# Patient Record
Sex: FEMALE | Race: OTHER | HISPANIC OR LATINO | Employment: OTHER | URBAN - METROPOLITAN AREA
[De-identification: names, ages, dates, MRNs, and addresses within clinical notes are randomized per-mention and may not be internally consistent; named-entity substitution may affect disease eponyms.]

---

## 2023-03-24 DIAGNOSIS — M19.90 ARTHRITIS: Primary | ICD-10-CM

## 2023-03-24 RX ORDER — MELOXICAM 15 MG/1
15 TABLET ORAL DAILY
Qty: 90 TABLET | Refills: 1 | Status: SHIPPED | OUTPATIENT
Start: 2023-03-24 | End: 2023-08-07

## 2023-03-24 RX ORDER — MELOXICAM 15 MG/1
15 TABLET ORAL DAILY
COMMUNITY
Start: 2019-10-28 | End: 2023-03-24 | Stop reason: SDUPTHER

## 2023-06-11 DIAGNOSIS — I10 ESSENTIAL (PRIMARY) HYPERTENSION: ICD-10-CM

## 2023-06-11 DIAGNOSIS — F41.8 OTHER SPECIFIED ANXIETY DISORDERS: ICD-10-CM

## 2023-06-12 RX ORDER — LISINOPRIL 10 MG/1
TABLET ORAL
Qty: 90 TABLET | Refills: 3 | Status: SHIPPED | OUTPATIENT
Start: 2023-06-12 | End: 2023-07-26

## 2023-06-12 RX ORDER — CITALOPRAM 20 MG/1
TABLET, FILM COATED ORAL
Qty: 90 TABLET | Refills: 1 | Status: SHIPPED | OUTPATIENT
Start: 2023-06-12 | End: 2023-12-07

## 2023-07-03 PROBLEM — I10 HYPERTENSION: Status: ACTIVE | Noted: 2023-07-03

## 2023-07-03 PROBLEM — F41.8 SITUATIONAL ANXIETY: Status: ACTIVE | Noted: 2023-07-03

## 2023-07-03 PROBLEM — M21.619 BUNION: Status: ACTIVE | Noted: 2023-07-03

## 2023-07-03 PROBLEM — N60.19 FIBROCYSTIC BREAST DISEASE (FCBD): Status: ACTIVE | Noted: 2023-07-03

## 2023-07-03 PROBLEM — H60.11 CELLULITIS OF RIGHT EAR: Status: ACTIVE | Noted: 2023-07-03

## 2023-07-03 PROBLEM — R29.6 RECURRENT FALLS: Status: ACTIVE | Noted: 2023-07-03

## 2023-07-03 PROBLEM — L21.9 SEBORRHEA: Status: ACTIVE | Noted: 2023-07-03

## 2023-07-03 PROBLEM — M25.611 STIFFNESS OF RIGHT SHOULDER, NOT ELSEWHERE CLASSIFIED: Status: ACTIVE | Noted: 2023-07-03

## 2023-07-03 PROBLEM — E55.9 VITAMIN D DEFICIENCY: Status: ACTIVE | Noted: 2023-07-03

## 2023-07-03 PROBLEM — E78.5 DYSLIPIDEMIA: Status: ACTIVE | Noted: 2023-07-03

## 2023-07-03 PROBLEM — R19.7 POSTCHOLECYSTECTOMY DIARRHEA: Status: ACTIVE | Noted: 2023-07-03

## 2023-07-03 PROBLEM — M67.813 BICEPS TENDONOSIS OF RIGHT SHOULDER: Status: ACTIVE | Noted: 2023-07-03

## 2023-07-03 PROBLEM — J30.9 ALLERGIC RHINITIS: Status: ACTIVE | Noted: 2023-07-03

## 2023-07-03 PROBLEM — M85.80 OSTEOPENIA: Status: ACTIVE | Noted: 2023-07-03

## 2023-07-03 PROBLEM — M20.42 HAMMER TOE OF LEFT FOOT: Status: ACTIVE | Noted: 2023-07-03

## 2023-07-03 PROBLEM — M19.079 ANKLE ARTHRITIS: Status: ACTIVE | Noted: 2023-07-03

## 2023-07-03 PROBLEM — Z97.3 WEARS GLASSES: Status: ACTIVE | Noted: 2023-07-03

## 2023-07-03 PROBLEM — K91.89 POSTCHOLECYSTECTOMY DIARRHEA: Status: ACTIVE | Noted: 2023-07-03

## 2023-07-03 PROBLEM — J06.9 UPPER RESPIRATORY INFECTION, ACUTE: Status: ACTIVE | Noted: 2023-07-03

## 2023-07-03 RX ORDER — PNV NO.95/FERROUS FUM/FOLIC AC 28MG-0.8MG
1000 TABLET ORAL DAILY
COMMUNITY
Start: 2022-01-20

## 2023-07-03 RX ORDER — FLUTICASONE PROPIONATE 50 MCG
2 SPRAY, SUSPENSION (ML) NASAL DAILY PRN
COMMUNITY
Start: 2015-12-09 | End: 2023-10-25 | Stop reason: WASHOUT

## 2023-07-03 RX ORDER — EPINEPHRINE 0.22MG
100 AEROSOL WITH ADAPTER (ML) INHALATION DAILY
COMMUNITY
Start: 2021-07-21

## 2023-07-03 RX ORDER — CHOLECALCIFEROL (VITAMIN D3) 125 MCG
1 CAPSULE ORAL DAILY
COMMUNITY
Start: 2013-07-29

## 2023-07-03 RX ORDER — CHOLESTYRAMINE 4 G/4.8G
POWDER, FOR SUSPENSION ORAL
COMMUNITY
End: 2023-07-26 | Stop reason: ALTCHOICE

## 2023-07-15 LAB
ALANINE AMINOTRANSFERASE (SGPT) (U/L) IN SER/PLAS: 16 U/L (ref 7–45)
ANION GAP IN SER/PLAS: 11 MMOL/L (ref 10–20)
CALCIDIOL (25 OH VITAMIN D3) (NG/ML) IN SER/PLAS: 38 NG/ML
CALCIUM (MG/DL) IN SER/PLAS: 9.1 MG/DL (ref 8.6–10.3)
CARBON DIOXIDE, TOTAL (MMOL/L) IN SER/PLAS: 27 MMOL/L (ref 21–32)
CHLORIDE (MMOL/L) IN SER/PLAS: 104 MMOL/L (ref 98–107)
CHOLESTEROL (MG/DL) IN SER/PLAS: 192 MG/DL (ref 0–199)
CHOLESTEROL IN HDL (MG/DL) IN SER/PLAS: 76.7 MG/DL
CHOLESTEROL/HDL RATIO: 2.5
CREATININE (MG/DL) IN SER/PLAS: 0.7 MG/DL (ref 0.5–1.05)
GFR FEMALE: >90 ML/MIN/1.73M2
GLUCOSE (MG/DL) IN SER/PLAS: 88 MG/DL (ref 74–99)
LDL: 106 MG/DL (ref 0–99)
POTASSIUM (MMOL/L) IN SER/PLAS: 3.9 MMOL/L (ref 3.5–5.3)
SODIUM (MMOL/L) IN SER/PLAS: 138 MMOL/L (ref 136–145)
THYROTROPIN (MIU/L) IN SER/PLAS BY DETECTION LIMIT <= 0.05 MIU/L: 1.49 MIU/L (ref 0.44–3.98)
TRIGLYCERIDE (MG/DL) IN SER/PLAS: 49 MG/DL (ref 0–149)
UREA NITROGEN (MG/DL) IN SER/PLAS: 13 MG/DL (ref 6–23)
VLDL: 10 MG/DL (ref 0–40)

## 2023-07-26 ENCOUNTER — OFFICE VISIT (OUTPATIENT)
Dept: PRIMARY CARE | Facility: CLINIC | Age: 68
End: 2023-07-26
Payer: MEDICARE

## 2023-07-26 DIAGNOSIS — Z00.00 HEALTHCARE MAINTENANCE: ICD-10-CM

## 2023-07-26 DIAGNOSIS — Z00.00 ROUTINE GENERAL MEDICAL EXAMINATION AT HEALTH CARE FACILITY: Primary | ICD-10-CM

## 2023-07-26 DIAGNOSIS — I10 ESSENTIAL HYPERTENSION WITH GOAL BLOOD PRESSURE LESS THAN 130/85: ICD-10-CM

## 2023-07-26 DIAGNOSIS — E78.5 DYSLIPIDEMIA, GOAL LDL BELOW 100: ICD-10-CM

## 2023-07-26 PROBLEM — K58.9 IBS (IRRITABLE BOWEL SYNDROME): Status: ACTIVE | Noted: 2023-07-26

## 2023-07-26 PROCEDURE — 99397 PER PM REEVAL EST PAT 65+ YR: CPT | Performed by: INTERNAL MEDICINE

## 2023-07-26 PROCEDURE — G0444 DEPRESSION SCREEN ANNUAL: HCPCS | Performed by: INTERNAL MEDICINE

## 2023-07-26 PROCEDURE — 1036F TOBACCO NON-USER: CPT | Performed by: INTERNAL MEDICINE

## 2023-07-26 PROCEDURE — 1170F FXNL STATUS ASSESSED: CPT | Performed by: INTERNAL MEDICINE

## 2023-07-26 PROCEDURE — 93000 ELECTROCARDIOGRAM COMPLETE: CPT | Performed by: INTERNAL MEDICINE

## 2023-07-26 PROCEDURE — 1160F RVW MEDS BY RX/DR IN RCRD: CPT | Performed by: INTERNAL MEDICINE

## 2023-07-26 PROCEDURE — 1159F MED LIST DOCD IN RCRD: CPT | Performed by: INTERNAL MEDICINE

## 2023-07-26 PROCEDURE — 3079F DIAST BP 80-89 MM HG: CPT | Performed by: INTERNAL MEDICINE

## 2023-07-26 PROCEDURE — G0439 PPPS, SUBSEQ VISIT: HCPCS | Performed by: INTERNAL MEDICINE

## 2023-07-26 PROCEDURE — 3075F SYST BP GE 130 - 139MM HG: CPT | Performed by: INTERNAL MEDICINE

## 2023-07-26 RX ORDER — MULTIVITAMIN
1 TABLET ORAL DAILY
COMMUNITY

## 2023-07-26 RX ORDER — LISINOPRIL 40 MG/1
40 TABLET ORAL DAILY
Qty: 90 TABLET | Refills: 3 | Status: SHIPPED | OUTPATIENT
Start: 2023-07-26 | End: 2024-05-26

## 2023-07-26 ASSESSMENT — ENCOUNTER SYMPTOMS
LOSS OF SENSATION IN FEET: 0
OCCASIONAL FEELINGS OF UNSTEADINESS: 0
DEPRESSION: 0

## 2023-07-26 ASSESSMENT — PATIENT HEALTH QUESTIONNAIRE - PHQ9
1. LITTLE INTEREST OR PLEASURE IN DOING THINGS: NOT AT ALL
SUM OF ALL RESPONSES TO PHQ9 QUESTIONS 1 AND 2: 0
2. FEELING DOWN, DEPRESSED OR HOPELESS: NOT AT ALL

## 2023-07-26 NOTE — PROGRESS NOTES
"Subjective   Reason for Visit: Zita Thorne is an 68 y.o. female here for a Medicare Wellness visit.     Past Medical, Surgical, and Family History reviewed and updated in chart.    Reviewed all medications by prescribing practitioner or clinical pharmacist (such as prescriptions, OTCs, herbal therapies and supplements) and documented in the medical record.    Here for follow-up and wellness visit.  Overall doing fairly well.    She has seen her eye doctor-she has had some eye flashing-she also has elevated intraocular pressure.    No exertional chest pain, palpitations, dizziness, orthopnea or pedal edema.        Patient Care Team:  Osmany Kwok MD as PCP - General  Osmany Kwok MD as PCP - [employer] PCP     Review of Systems    Objective   Vitals:  /84 (BP Location: Left arm, Patient Position: Sitting, BP Cuff Size: Adult)   Pulse 65   Temp 36.6 °C (97.8 °F)   Resp 16   Ht 1.562 m (5' 1.5\")   Wt 62.7 kg (138 lb 3.2 oz)   SpO2 99%   BMI 25.69 kg/m²       Physical Exam  Vitals reviewed.   Constitutional:       Appearance: Normal appearance.   HENT:      Head: Normocephalic and atraumatic.      Right Ear: Tympanic membrane normal.      Left Ear: Tympanic membrane normal.      Nose: Nose normal.   Eyes:      General: No scleral icterus.        Right eye: No discharge.         Left eye: No discharge.      Extraocular Movements: Extraocular movements intact.      Conjunctiva/sclera: Conjunctivae normal.      Pupils: Pupils are equal, round, and reactive to light.   Cardiovascular:      Rate and Rhythm: Normal rate and regular rhythm.      Pulses: Normal pulses.      Heart sounds: Normal heart sounds. No murmur heard.  Pulmonary:      Effort: Pulmonary effort is normal.      Breath sounds: Normal breath sounds. No wheezing or rhonchi.   Musculoskeletal:         General: No deformity or signs of injury. Normal range of motion.      Cervical back: Normal range of motion and neck supple. No " rigidity or tenderness.   Lymphadenopathy:      Cervical: No cervical adenopathy.   Skin:     General: Skin is warm and dry.      Findings: No rash.   Neurological:      General: No focal deficit present.      Mental Status: She is alert and oriented to person, place, and time. Mental status is at baseline.      Cranial Nerves: No cranial nerve deficit.      Sensory: No sensory deficit.      Gait: Gait normal.   Psychiatric:         Mood and Affect: Mood normal.         Behavior: Behavior normal.         Thought Content: Thought content normal.         Judgment: Judgment normal.         Assessment/Plan   Problem List Items Addressed This Visit    None  Visit Diagnoses       Routine general medical examination at health care facility    -  Primary    Relevant Orders    1 Year Follow Up In [facility] - PCP - Wellness Exam    Healthcare maintenance        Relevant Orders    ECG 12 lead (Clinic Performed)    Essential hypertension with goal blood pressure less than 130/85        Relevant Medications    lisinopril 40 mg tablet    Other Relevant Orders    Follow Up In [facility] - PCP - Established    Follow Up In [facility] - Pharmacy    Dyslipidemia, goal LDL below 100                 Hypertension- blood pressure in the past has been controlled with low-dose lisinopril. She will continue healthy diet including low salt approach, as well as fitness routine.  Home blood pressure machine correlated.            Blood pressure elevated.  She will meet with our pharmacy team and advance lisinopril to 40 mg daily. advised to notify us if readings are consistently above 130.     Dyslipidemia-goal LDL under 100.          July 2023-.  For now we will continue off statin     exercise routine - at gym- [employer] near her home 4 x wk. - in AM before work, where she meets w/  who helps her w/ work outs. 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     Right shoulder rotator cuff issues/tendinitis- clearly came on after exercise class doing aggressive workouts. She will see orthopedics and change from ibuprofen to meloxicam.             Improved w/ input from Dr. Santamaria fall '19 with physical therapy    Neck spasms - spring '23 w/ tension headaches.  Improved w/ input per chiropractor, Dr. Olivia.         Right first MTP discomfort-minimal arthritic first MTP changes. Encouraged optimal arch support and wide fitting shoes           Feet OK now     Varicose vein-left tibial area will follow     Hx urticaria -- after Elderberry supp. she saw Dr. Garcia in the allergy clinic spring 2019 to further clarify     Situational anxiety -she will continue citalopram which has been helpful     Occasional epigastric discomfort-clearly much more stress at work. Suggested Pepcid AC or Zantac 75 before work or spicy or acidic foods accordingly and follow-up if symptoms persist.    Presently not active issue     Hx loose bowel mvts - similar to around 2003, when she worked w/ Dr. Haley Goff / [facility], focusing on elimination diet. no severe pain, but still loose BMs - 2-3 BMs daily.  She will try to identify triggers and avoid these accordingly.                  Colonoscopy updated 10/18; next in 10 yrs - 10/28. No bowel Mv concerns now. Stable            With diet changes, she has no dyspepsia or IBS symptoms     Gynecologic care / postmenopausal - s/p pap in May '18 when she had RLQ pain, US showed ovaries \"OK\" but fluid in uterus. Negative EMBx June '18. She plans to f/u ( Dr. Turner). now as needed following fall '18   Her gynecologist retired-she requested a new practice-I suggested [facility] women[facility]           " Presently no gyn concerns     Annual mammogram -updated Feb '23     Osteopenia/vitamin D deficiency-she will continue vitamin D consistently, and DEXA scanning as below-- updated 9/17, as well as her weightbearing exercise routine.            DEXA updated Feb '22. Biannual surveillance. Next 2024.      Right hand pain-history of right hand fracture several years ago with several metacarpal fractures. Presently her index finger and ring finger on the right are sore. 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 s/p allergy visit sev. yrs ago.        Elevated intraocular pressure/floaters/glasses/vision care- patient will continue routine vision exams and visits at least biannually- updated recently w/ new glasses in 12/22.           7/23 update-she has had some right eye pressure problems and floaters.  She continues to work with Dr. Prado     Dental care-encouraged semiannual dental visits. Next appt 7/31/23  UTD     Skin Care concerns - encouraged skin care, sun screen when outdoors, and follow up w/ dermatology w/ any concerning skin changes. Hasn't needed dermatologist fortunately             She finds carbs tends to cause outbreaks on calves and tricep areas. 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 her eldest grandson has had mental health issues-3 grandsons.  the youngest grandson is autistic.             7/23-her grandson is going  to college at [employer].  Its been a good summer with his graduation.      Flu shot encouraged each fall     Prevnar 20- updated  2/23.     Discussed Shingrix / new shingles shot - 2 shot series w/ limited availability. Encouraged patient to consider getting this when/ where available- slip provided.           She has had her first shot     Covid vaccination series recommended/declined     Follow-up semiannually, sooner as needed

## 2023-07-30 VITALS
SYSTOLIC BLOOD PRESSURE: 136 MMHG | RESPIRATION RATE: 16 BRPM | HEIGHT: 62 IN | BODY MASS INDEX: 25.43 KG/M2 | DIASTOLIC BLOOD PRESSURE: 80 MMHG | HEART RATE: 65 BPM | OXYGEN SATURATION: 99 % | WEIGHT: 138.2 LBS | TEMPERATURE: 97.8 F

## 2023-07-30 ASSESSMENT — ACTIVITIES OF DAILY LIVING (ADL)
BATHING: INDEPENDENT
ASSISTIVE_DEVICE: EYEGLASSES
DRESSING YOURSELF: INDEPENDENT
FEEDING YOURSELF: INDEPENDENT
PATIENT'S MEMORY ADEQUATE TO SAFELY COMPLETE DAILY ACTIVITIES?: YES
TOILETING: INDEPENDENT
ADEQUATE_TO_COMPLETE_ADL: YES
GROOMING: INDEPENDENT
HEARING - LEFT EAR: FUNCTIONAL
WALKS IN HOME: INDEPENDENT
HEARING - RIGHT EAR: FUNCTIONAL
JUDGMENT_ADEQUATE_SAFELY_COMPLETE_DAILY_ACTIVITIES: YES

## 2023-07-30 ASSESSMENT — PATIENT HEALTH QUESTIONNAIRE - PHQ9
1. LITTLE INTEREST OR PLEASURE IN DOING THINGS: NOT AT ALL
2. FEELING DOWN, DEPRESSED OR HOPELESS: NOT AT ALL
SUM OF ALL RESPONSES TO PHQ9 QUESTIONS 1 AND 2: 0

## 2023-08-06 DIAGNOSIS — M19.90 ARTHRITIS: ICD-10-CM

## 2023-08-07 RX ORDER — MELOXICAM 15 MG/1
TABLET ORAL
Qty: 90 TABLET | Refills: 3 | Status: SHIPPED | OUTPATIENT
Start: 2023-08-07

## 2023-08-28 ENCOUNTER — APPOINTMENT (OUTPATIENT)
Dept: PHARMACY | Facility: HOSPITAL | Age: 68
End: 2023-08-28
Payer: MEDICARE

## 2023-08-30 ENCOUNTER — CLINICAL SUPPORT (OUTPATIENT)
Dept: PRIMARY CARE | Facility: CLINIC | Age: 68
End: 2023-08-30
Payer: MEDICARE

## 2023-08-30 VITALS — SYSTOLIC BLOOD PRESSURE: 138 MMHG | HEART RATE: 66 BPM | DIASTOLIC BLOOD PRESSURE: 78 MMHG

## 2023-08-30 DIAGNOSIS — I10 HYPERTENSION, UNSPECIFIED TYPE: Primary | ICD-10-CM

## 2023-08-30 DIAGNOSIS — I10 ESSENTIAL HYPERTENSION WITH GOAL BLOOD PRESSURE LESS THAN 130/85: ICD-10-CM

## 2023-08-30 RX ORDER — ACETAMINOPHEN, DIPHENHYDRAMINE HCL, PHENYLEPHRINE HCL 325; 25; 5 MG/1; MG/1; MG/1
5000 TABLET ORAL DAILY
COMMUNITY

## 2023-08-30 RX ORDER — ELECTROLYTES/DEXTROSE
5 SOLUTION, ORAL ORAL DAILY
COMMUNITY

## 2023-08-30 RX ORDER — GLUTAMINE 500 MG
500 CAPSULE ORAL DAILY
COMMUNITY

## 2023-08-30 ASSESSMENT — ENCOUNTER SYMPTOMS
PALPITATIONS: 1
HEADACHES: 1
HYPERTENSION: 1

## 2023-08-30 NOTE — PROGRESS NOTES
Hypertension/Blood Pressure Monitor Validation Initial Visit  Pharmacist Clinic: Hypertension Management    Zita Thorne was referred to the Clinical Pharmacy Team for her blood pressure management.    Referring Provider: Osmany Kwok MD     Subjective     Allergies   Allergen Reactions    Cider Vinegar Hives    Kiwi Hives    Strawberry Hives    Watermelon Hives     watermelon       Hypertension  This is a chronic problem. The current episode started more than 1 year ago. The problem has been gradually worsening since onset. The problem is uncontrolled. Associated symptoms include headaches and palpitations. Agents associated with hypertension include NSAIDs. There are no known risk factors for coronary artery disease. Past treatments include beta blockers. The current treatment provides mild improvement. There are no compliance problems.      Patient's last blood pressure in office was 136/80 mmHg on 07/30/23. Patient does note that she does have some anxiety about her blood pressure being elevated which may play a role in some of her readings being higher than others. She states that she watches her grandchildren 3-5 days per week in the summer and states that she believes her blood pressure has been higher on those days. She does have a family history of hypertension, both her father and mother and all of her siblings have high blood pressure. 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     Diet:   Breakfast: wakes up early, 1/2 banana and protein smoothie (almond milk or yogurt with protein)   Lunch: vegetable, salad, chicken/hamburger eva/salmon (lunch is patient's largest meal), sometimes 1/2 of of potatoe    Dinner: Protein shake, yogurt with fruit   Snacks: Rarely, will have another protein shake if she gets hungry   Fluids: water (1 gallon per day), sometimes green tea   Caffeine: 2 cups of coffee per day (1  in the morning, 1 in the afternoon)     Sodium Intake:  Cooks with salt, switched to himalayen and dash   Very rarely uses canned foods or broths     Physical Activity:   Works as a    M/T/W/F works out an hour and then demos for her classes in addition      Blood Pressure Monitor Device: Omron Series 10 purchased at least 2 years ago     Affordability/Accessibility: No issues  Adherence/Organization: No issues   Did you take your antihypertensive medications this morning: Yes  Time of Administration of Antihypertensive(s):  0900  Have you missed any doses of your antihypertensives? No ; If yes, how many doses? 0  Adverse Effects: Reports increased fatigue with lisinopril  States that she did notice this with the 10 mg dose however took it at night to help avoid the fatigue  Since her dose was increased to the 40 mg she has been taking it in the morning and states the fatigue has been more significant     Hypertension Pharmacotherapy:  Lisinopril 40 mg once daily (increased from 10 mg by Dr. Kwok on 07/26/23)     Historical Hypertension Pharmacotherapy:   Atenolol 25 mg (believes her blood pressure numbers were improved so it was discontinued)     Medication Reconciliation  Added:  Align 1 capsule once daily  Biotin 5 mg once daily   Cyanocobalamin 5000 mcg once daily   L-glutamine 500 mg once daily   Magnesium citrate 1300 mg once daily   Turmeric 1500 mg once daily     Objective     /78 (BP Location: Left arm)   Pulse 66     RELEVANT LAB RESULTS  Lab Results   Component Value Date    BILITOT 0.6 04/02/2019    CALCIUM 9.1 07/15/2023    CO2 27 07/15/2023     07/15/2023    CREATININE 0.70 07/15/2023    GLUCOSE 88 07/15/2023    ALKPHOS 83 04/02/2019    K 3.9 07/15/2023    PROT 7.0 04/02/2019     07/15/2023    AST 20 04/02/2019    ALT 16 07/15/2023    BUN 13 07/15/2023    ANIONGAP 11 07/15/2023    ALBUMIN 4.3 04/02/2019    GFRF >90 07/15/2023     Lab Results   Component Value Date  "   TRIG 49 07/15/2023    CHOL 192 07/15/2023    HDL 76.7 07/15/2023     No results found for: \"HGBA1C\"  The 10-year ASCVD risk score (Amish PARIS, et al., 2019) is: 10.6%    Values used to calculate the score:      Age: 68 years      Sex: Female      Is Non- : No      Diabetic: No      Tobacco smoker: No      Systolic Blood Pressure: 138 mmHg      Is BP treated: Yes      HDL Cholesterol: 76.7 mg/dL      Total Cholesterol: 192 mg/dL    DRUG INTERACTIONS  No significant drug-drug interactions exist that require change in therapy    SMBP Measurements   Date/Time AM PM   23 164/99 157/87   23 172/98 173/91    150/85    2023 184/105     168/86    23 155/92 147/90   23  160/85   23  173/97   23  160/86   08/15/23  154/93   23  166/90   23  159/92   23  161/91   23  171/98   23  156/89     ASSESSMENT OF SMBP MEASUREMENTS  Highest readin/105 mmHg  Lowest readin/90 mmHg  Average: 163/92 mmHg    Has the patient experienced any low blood pressures since last contact? No  denies symptoms of low blood pressure: lightheadedness, dizziness, falls, blurry vision, clammy/pale skin   Has the patient experienced any high blood pressures since last contact? Yes  reports symptoms of high blood pressure: headache    DEVICE ACCURACY TEST   Care team should take five blood pressure readings using a combination of the patient's SMBP device and the office's method of blood pressure measurement.  STEP 1:              A Patient's Monitor left arm 163/93 mmHg HR 67   B Patient's Monitor left arm 138/86 mmHg HR 66   C Office's Monitor left arm 133/89 mmHg HR 67   D Patient's Monitor left arm 138/78 mmHg HR 66   E Office's Monitor left arm NOT NEEDED       STEP 2:  Part 1: Average measurements B and D   Part 2: Compare average of B and D to measurement C   Part 3: If the difference is …  --> Less than 5 mm Hg, this device can be used for " SMBP  --> Between 6 and 10 mm Hg, proceed to Step 3  --> Greater than 10 mm Hg, replace the device before proceeding with your SMBP program  STEP 3:  Part 1: Average measurements C and E   Part 2: Compare average of C and E to measurement D   Part 3: If the difference is …  --> Less than or equal to 10 mm Hg, this device can be used for SMBP  --> Greater than 10 mm Hg, replace the device before proceeding with your SMBP program    CONCLUSION: This BP monitor is acceptable for home BP monitoring.    Assessment/Plan   Problem List Items Addressed This Visit       Hypertension - Primary     Other Visit Diagnoses       Essential hypertension with goal blood pressure less than 130/85        Relevant Orders    Follow Up In [facility] - Pharmacy          Blood Pressure monitor is validated for at home use  Blood pressure log/diary was present during today's visit.   Smoker status: non-smoker  Blood Pressure:  uncontrolled  BP goal of <130/85 is NOT met   CONTINUE: Lisinopril 40 mg 1 tablet by mouth once daily   While patient's home readings over the past month have been well above goal, her readings in-office today were closer to goal with most in the 130s/80s. As her 's health improves and she no longer has to watch her grandchildren, her stress levels should also decrease at least slightly. I encouraged patient to check her blood pressure once daily every evening checking the blood pressure twice each time. I will follow-up with her in ~1 month to assess her readings at that time.   Future Considerations: If blood pressure is still uncontrolled at next visit, can start hydrochlorothiazide 12.5 mg once daily   Patient inquired about her lisinopril as she is taking it in the morning and is experiencing significant fatigue. I informed her that she can switch to taking it in the evening again as this helped her with the 10 mg dose. I explained that if she notices her evening blood pressure readings start to  increase she should switch to checking her blood pressures first thing in the morning prior to drinking coffee and see if they are any lower to give the lisinopril time to work.   Counseling Points:  I have counseled this patient on appropriate blood pressure monitor techniques, provided a blood pressure monitor log, and have advised the patient to contact me with questions regarding their blood pressure.  Medications are properly dosed for renal and hepatic function.    Pharmacy Follow Up: September 27, 2023    PCP Follow Up: January 29, 2024    Zane Holbrook PharmD    Continue all meds under the continuation of care with the referring provider and clinical pharmacy team.

## 2023-08-30 NOTE — PATIENT INSTRUCTIONS
It was a pleasure meeting you today! Here is a summary of what we discussed:     CONTINUE: Lisinopril 40 mg 1 tablet by mouth once daily   It is ok to switch to taking this before bedtime since you are experiencing fatigue after taking it. If you notice your evening blood pressures start to increase with this switch to checking your blood pressure in the morning before drinking coffee to give the lisinopril more time to work on your blood pressures.   Continue making healthy eating choices and your current exercise routine    If you have any questions prior to our next telephone appointment, do not hesitate to reach myself at [phone].     Thank you!

## 2023-09-27 ENCOUNTER — APPOINTMENT (OUTPATIENT)
Dept: PHARMACY | Facility: HOSPITAL | Age: 68
End: 2023-09-27
Payer: MEDICARE

## 2023-09-28 ENCOUNTER — TELEMEDICINE (OUTPATIENT)
Dept: PHARMACY | Facility: HOSPITAL | Age: 68
End: 2023-09-28
Payer: MEDICARE

## 2023-09-28 DIAGNOSIS — I10 ESSENTIAL HYPERTENSION WITH GOAL BLOOD PRESSURE LESS THAN 130/85: ICD-10-CM

## 2023-09-28 ASSESSMENT — ENCOUNTER SYMPTOMS
HEADACHES: 1
HYPERTENSION: 1
BLURRED VISION: 0
PALPITATIONS: 1

## 2023-09-28 NOTE — PROGRESS NOTES
Hypertension/Blood Pressure Monitor Validation Initial Visit  Pharmacist Clinic: Hypertension Management    Zita Thorne was referred to the Clinical Pharmacy Team for her blood pressure management.    Referring Provider: Osmany Kwok MD     Subjective     Allergies   Allergen Reactions    Cider Vinegar Hives    Kiwi Hives    Strawberry Hives    Watermelon Hives     watermelon       Hypertension  This is a chronic problem. The current episode started more than 1 year ago. The problem has been gradually worsening since onset. The problem is uncontrolled. Associated symptoms include headaches, malaise/fatigue and palpitations. Pertinent negatives include no blurred vision or chest pain. Agents associated with hypertension include NSAIDs. There are no known risk factors for coronary artery disease. Past treatments include beta blockers. The current treatment provides mild improvement. There are no compliance problems.      Patient's last blood pressure in office was 138/78 mmHg and 138/86 mmHg on 8/30/2023 with a HR of 66 BPM. She notes added stressors in her life which include the following:   She watches her grandchildren 3-5 days per week in the summer and states that she believes her blood pressure has been higher on those days.  She does have a family history of hypertension, both her father and mother and all of her siblings have high blood pressure.   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   Notes that she has had some friends/family staying in her home.   Dog of 10 years recently passed away as well. Notes that she has been experiencing more stressors in her life.     Diet:   Discussed breakfast, lunch and dinner previously with Zane Holbrook PharmD  Fluids: water (1 gallon per day), sometimes green tea; Has noted that she has begun drinking Liquid IV  Caffeine: 2 cups of coffee per day (1 in the morning,  "1 in the afternoon)  Notes that she had a cup of coffee approximately 45 minutes prior to our call today on 9/28/2023     Sodium Intake:  Cooks with salt, switched to himalayen and dash   Very rarely uses canned foods or broths     Physical Activity:   Works as a  at [employer]  M/T/W/F works out an hour and then demos for her classes in addition      Blood Pressure Monitor Device: Omron Series 10 purchased at least 2 years ago     Affordability/Accessibility: No issues  Adherence/Organization: No issues   Did you take your antihypertensive medications this morning: Yes  Time of Administration of Antihypertensive(s):  0930 ; Retired - But works as a  from 5:30 am to 9 am   Have you missed any doses of your antihypertensives? No ; If yes, how many doses? 0  Adverse Effects:   Fatigue is improving, but notes that she feels she has to continue moving. Feels she could fall asleep 20 minutes after taking it.     Hypertension Pharmacotherapy:  Lisinopril 40 mg once daily (increased from 10 mg by Dr. Kwok on 07/26/23)     Historical Hypertension Pharmacotherapy:   Atenolol 25 mg (believes her blood pressure numbers were improved so it was discontinued)     Medication Reconciliation  - No changes to medication record on 9/28/23    Objective     There were no vitals taken for this visit.    RELEVANT LAB RESULTS  Lab Results   Component Value Date    BILITOT 0.6 04/02/2019    CALCIUM 9.1 07/15/2023    CO2 27 07/15/2023     07/15/2023    CREATININE 0.70 07/15/2023    GLUCOSE 88 07/15/2023    ALKPHOS 83 04/02/2019    K 3.9 07/15/2023    PROT 7.0 04/02/2019     07/15/2023    AST 20 04/02/2019    ALT 16 07/15/2023    BUN 13 07/15/2023    ANIONGAP 11 07/15/2023    ALBUMIN 4.3 04/02/2019    GFRF >90 07/15/2023     Lab Results   Component Value Date    TRIG 49 07/15/2023    CHOL 192 07/15/2023    HDL 76.7 07/15/2023     No results found for: \"HGBA1C\"  The 10-year ASCVD risk score " (Amish PARIS, et al., 2019) is: 10.6%    Values used to calculate the score:      Age: 68 years      Sex: Female      Is Non- : No      Diabetic: No      Tobacco smoker: No      Systolic Blood Pressure: 138 mmHg      Is BP treated: Yes      HDL Cholesterol: 76.7 mg/dL      Total Cholesterol: 192 mg/dL    DRUG INTERACTIONS  No significant drug-drug interactions exist that require change in therapy    Timing of BP Readings: 3 pm - 8 pm  Arm of Choice: Left   Timing of Medication: ~9-930 am     SMBP Measurements      Date SBP DBP   2023 156 89   2023 150 86   2023 144 81   2023 154 81   2023 144 81   2023 155 86   2023 154 83   Average 151 84     ASSESSMENT OF SMBP MEASUREMENTS  Highest readin/89 mmHg  Lowest readin/81 mmHg  Average: 151/84 mmHg    SMBP Measurements   Date/Time AM PM   23 164/99 157/87   23 172/98 173/91    150/85    2023 184/105     168/86    23 155/92 147/90   23  160/85   23  173/97   23  160/86   08/15/23  154/93   23  166/90   23  159/92   23  161/91   23  171/98   23  156/89     ASSESSMENT OF SMBP MEASUREMENTS  Highest readin/105 mmHg  Lowest readin/90 mmHg  Average: 163/92 mmHg    Has the patient experienced any low blood pressures since last contact? No  denies symptoms of low blood pressure: lightheadedness, dizziness, falls, blurry vision, clammy/pale skin   Has the patient experienced any high blood pressures since last contact? Yes  reports symptoms of high blood pressure: Headache (States this has improved, notes approximately two headaches)    Assessment/Plan   Problem List Items Addressed This Visit       Essential hypertension with goal blood pressure less than 130/85    Relevant Medications    amLODIPine (Norvasc) 5 mg tablet    Other Relevant Orders    Follow Up In [facility] - Pharmacy     Blood Pressure monitor is  validated for at home use  Blood pressure log/diary was present during today's visit. 7 readings were available between previous visit and today.   Smoker status: non-smoker  Blood Pressure:  uncontrolled  BP goal of <130/85 is NOT met   Start:  Amlodipine 5 mg: Take one tablet by mouth once daily in the morning  CONTINUE:   Lisinopril 40 mg 1 tablet by mouth once daily   Education:     I have advised Zita to begin taking her Amlodipine 5 mg once daily in the morning and change her time of administration of Lisinopril 40 mg to once daily in the evening before bedtime  She notes that she experiences fatigue approximately 20 minutes after administration of Lisinopril, I do not endorse that this side effect is occurring due to the administration of Lisinopril  We discussed alternative medication therapy including hydrochlorothiazide and chlorthalidone as alternative medication therapies. However, at this time I believe the addition of a non-DHP would provide the most benefit to her currently elevated BP readings.  I do also believe that the added stressors in her home and environmentally are affecting her readings.   I have encouraged zita to begin more frequent monitoring on a regularly scheduled basis for better interpretation of her home readings.   Medications are properly dosed for renal and hepatic function.    Pharmacy Follow Up: 4 weeks  PCP Follow Up: January 29, 2024    Deejay Sadler, PharmD    Continue all meds under the continuation of care with the referring provider and clinical pharmacy team.

## 2023-09-29 RX ORDER — AMLODIPINE BESYLATE 5 MG/1
5 TABLET ORAL DAILY
Qty: 30 TABLET | Refills: 5 | Status: SHIPPED | OUTPATIENT
Start: 2023-09-29 | End: 2024-02-16

## 2023-10-25 ENCOUNTER — TELEMEDICINE (OUTPATIENT)
Dept: PHARMACY | Facility: HOSPITAL | Age: 68
End: 2023-10-25
Payer: MEDICARE

## 2023-10-25 DIAGNOSIS — I10 ESSENTIAL HYPERTENSION WITH GOAL BLOOD PRESSURE LESS THAN 130/85: ICD-10-CM

## 2023-10-25 ASSESSMENT — ENCOUNTER SYMPTOMS
HYPERTENSION: 1
BLURRED VISION: 0

## 2023-10-25 NOTE — PROGRESS NOTES
Hypertension/Blood Pressure Monitor Validation Follow-Up Visit  Pharmacist Clinic: Hypertension Management    Zita Thorne was referred to the Clinical Pharmacy Team for her blood pressure management.    Referring Provider: Osmany Kwok MD     Subjective     Allergies   Allergen Reactions    Cider Vinegar Hives    Kiwi Hives    Strawberry Hives    Watermelon Hives     watermelon     Hypertension  This is a chronic problem. The current episode started more than 1 year ago. The problem has been resolved since onset. The problem is controlled. Pertinent negatives include no blurred vision or chest pain. Agents associated with hypertension include NSAIDs. There are no known risk factors for coronary artery disease. Past treatments include beta blockers. The current treatment provides significant improvement. There are no compliance problems.      Patient's last blood pressure in office was 138/78 mmHg and 138/86 mmHg on 8/30/2023 with a HR of 66 BPM. She notes added stressors in her life which include the following:   She watches her grandchildren 3-5 days per week in the summer and states that she believes her blood pressure has been higher on those days.  She does have a family history of hypertension, both her father and mother and all of her siblings have high blood pressure.   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   Notes that she has had some friends/family staying in her home.   Dog of 10 years recently passed away as well. Notes that she has been experiencing more stressors in her life.     Diet:   Discussed breakfast, lunch and dinner previously with Zane Holbrook PharmD  Fluids: water (1 gallon per day), sometimes green tea; Has noted that she has begun drinking Liquid IV  Caffeine: 2 cups of coffee per day (1 in the morning, 1 in the afternoon)    Sodium Intake:  Cooks with salt, switched to himalayen  and dash   Very rarely uses canned foods or broths     Physical Activity:   Works as a  at [employer]  M/T/W/F works out an hour and then demos for her classes in addition      Hypertension Pharmacotherapy:  Lisinopril 40 mg once daily at bedtime   Amlodipine 5 mg: Take one tablet by mouth once daily at 9 am (Started on 10/7/2023)    Historical Hypertension Pharmacotherapy:   Atenolol 25 mg (believes her blood pressure numbers were improved so it was discontinued)     Medication Reconciliation  - No changes to medication record on 23    SMBP Monitoring: Blood Pressure Monitor Device: Omron Series 10 purchased at least 2 years ago     Timing of BP Readings: 10 am - 1 pm   Arm of Choice: Left arm and right arm  Timing of Medication: ~9-930 am ; ~10 pm     Left Arm  Right Arm    Date SBP DBP SBP DBP   10/1/2023       10/2/2023 143 81 134 85   10/3/2023       10/4/2023 132 88 131 82   10/5/2023       10/6/2023 147 84 146 83   10/7/2023 152 89 140 77   10/8/2023       10/9/2023       10/10/2023 130 77 133 81   10/11/2023 138 86 137 89   10/12/2023 137 77 136 76   10/13/2023 122 77 129 76   10/14/2023       10/15/2023       10/16/2023 120 79 120 78   10/17/2023 144 82 142 82   10/18/2023       10/19/2023 125 73 122 72   10/20/2023 129 78 135 75   10/21/2023       10/22/2023 122 75 125 73   10/23/2023       10/24/2023 125 78 128 79   10/25/2023 118 71 117 73     ASSESSMENT OF SMBP MEASUREMENTS  Highest readin/89 mmHg  Lowest readin/73 mmHg  Left Arm Average: 128/77 mmHg  Right Arm Average: 129/77 mmHg    ASSESSMENT OF PREVIOUS SMBP MEASUREMENTS  Highest readin/89 mmHg  Lowest readin/81 mmHg  Average: 151/84 mmHg    Affordability/Accessibility: No issues  Adherence/Organization: No issues   Did you take your antihypertensive medications this morning: Yes  Time of Administration of Antihypertensive(s):  0930 ; Retired - But works as a  from 5:30 am to 9 am  "  Have you missed any doses of your antihypertensives? No ; If yes, how many doses? 0  Adverse Effects:   No noted adverse events since starting Amlodipine 5 mg   Denies dizziness, lightheadedness, falls    Objective     There were no vitals taken for this visit.    RELEVANT LAB RESULTS  Lab Results   Component Value Date    BILITOT 0.6 04/02/2019    CALCIUM 9.1 07/15/2023    CO2 27 07/15/2023     07/15/2023    CREATININE 0.70 07/15/2023    GLUCOSE 88 07/15/2023    ALKPHOS 83 04/02/2019    K 3.9 07/15/2023    PROT 7.0 04/02/2019     07/15/2023    AST 20 04/02/2019    ALT 16 07/15/2023    BUN 13 07/15/2023    ANIONGAP 11 07/15/2023    ALBUMIN 4.3 04/02/2019    GFRF >90 07/15/2023     Lab Results   Component Value Date    TRIG 49 07/15/2023    CHOL 192 07/15/2023    HDL 76.7 07/15/2023     No results found for: \"HGBA1C\"  The 10-year ASCVD risk score (Amish PARIS, et al., 2019) is: 10.6%    Values used to calculate the score:      Age: 68 years      Sex: Female      Is Non- : No      Diabetic: No      Tobacco smoker: No      Systolic Blood Pressure: 138 mmHg      Is BP treated: Yes      HDL Cholesterol: 76.7 mg/dL      Total Cholesterol: 192 mg/dL    DRUG INTERACTIONS  No significant drug-drug interactions exist that require change in therapy    Assessment/Plan   Problem List Items Addressed This Visit       Essential hypertension with goal blood pressure less than 130/85   Blood Pressure monitor is validated for at home use  Blood pressure log/diary was present during today's visit between previous visit and today.   Smoker status: non-smoker  Blood Pressure:  controlled  BP goal of <130/85 is met   CONTINUE:   Lisinopril 40 mg 1 tablet by mouth once daily in the evening  Amlodipine 5 mg: Take one tablet by mouth once daily in the morning  Education:   I have advised Zita to continue taking her Amlodipine 5 mg once daily in the morning and change her time of administration of " Lisinopril 40 mg to once daily in the evening before bedtime  Continue monitoring BP in both arms every other day. Follow up in 3 months.  Medications are properly dosed for renal and hepatic function.    Pharmacy Follow Up: 3 months  PCP Follow Up: January 29, 2024    Deejay Sadler PharmD    Continue all meds under the continuation of care with the referring provider and clinical pharmacy team.

## 2023-12-07 DIAGNOSIS — F41.8 OTHER SPECIFIED ANXIETY DISORDERS: ICD-10-CM

## 2023-12-07 RX ORDER — CITALOPRAM 20 MG/1
20 TABLET, FILM COATED ORAL DAILY
Qty: 90 TABLET | Refills: 1 | Status: SHIPPED | OUTPATIENT
Start: 2023-12-07 | End: 2023-12-20 | Stop reason: SDUPTHER

## 2023-12-20 DIAGNOSIS — F41.8 OTHER SPECIFIED ANXIETY DISORDERS: ICD-10-CM

## 2023-12-20 RX ORDER — CITALOPRAM 20 MG/1
20 TABLET, FILM COATED ORAL DAILY
Qty: 90 TABLET | Refills: 1 | Status: SHIPPED | OUTPATIENT
Start: 2023-12-20

## 2024-01-10 ENCOUNTER — TELEMEDICINE (OUTPATIENT)
Dept: PHARMACY | Facility: HOSPITAL | Age: 69
End: 2024-01-10
Payer: MEDICARE

## 2024-01-10 DIAGNOSIS — I10 ESSENTIAL HYPERTENSION WITH GOAL BLOOD PRESSURE LESS THAN 130/85: ICD-10-CM

## 2024-01-10 RX ORDER — AMOXICILLIN AND CLAVULANATE POTASSIUM 875; 125 MG/1; MG/1
1 TABLET, FILM COATED ORAL 2 TIMES DAILY
COMMUNITY
Start: 2024-01-04 | End: 2024-01-11

## 2024-01-10 ASSESSMENT — ENCOUNTER SYMPTOMS
HYPERTENSION: 1
BLURRED VISION: 0

## 2024-01-10 NOTE — PROGRESS NOTES
Hypertension/Blood Pressure Monitor Validation Follow-Up Visit  Pharmacist Clinic: Hypertension Management    Zita Thorne was referred to the Clinical Pharmacy Team for her blood pressure management.    Referring Provider: Osmany Kwok MD     Subjective     Allergies   Allergen Reactions    Cider Vinegar Hives    Kiwi Hives    Strawberry Hives    Watermelon Hives     watermelon     Hypertension  This is a chronic problem. The current episode started more than 1 year ago. The problem has been resolved since onset. The problem is controlled. Pertinent negatives include no blurred vision or chest pain. Agents associated with hypertension include NSAIDs. There are no known risk factors for coronary artery disease. Past treatments include beta blockers. The current treatment provides significant improvement. There are no compliance problems.      Diet:   Discussed breakfast, lunch and dinner previously with Zane Holbrook PharmD  Fluids: water (1 gallon per day), sometimes green tea; Has noted that she has begun drinking Liquid IV  Caffeine: 2 cups of coffee per day (1 in the morning, 1 in the afternoon)    Sodium Intake:  Cooks with salt, switched to himalayen and dash   Very rarely uses canned foods or broths     Physical Activity:   Works as a  at [employer]  M/T/W/F works out an hour and then demos for her classes in addition      Hypertension Pharmacotherapy:  Lisinopril 40 mg once daily at bedtime   Amlodipine 5 mg: Take one tablet by mouth once daily at 9 am (Started on 10/7/2023)    Historical Hypertension Pharmacotherapy:   Atenolol 25 mg (believes her blood pressure numbers were improved so it was discontinued)     Medication Reconciliation  Added:   - Amoxicillin/Clavulanate  875/125 mg: Take one tablet by mouth twice daily (therapy to be completed on 1/11/2024)    SMBP Monitoring: Blood Pressure Monitor Device: Omron Series 10 purchased at least 2 years ago     Timing of BP  Readings: 10 am - 1 pm   Arm of Choice: Left arm and right arm  Timing of Medication: ~9-930 am ; ~10 pm     Left  Right    Date SBP DBP SBP DBP   1/3/2024 169 97     2024 122 77     2024 115 71 109 73   2024 142 78 130 78   2024 135 80 138 78   1/10/2024 124 74 124 79   Average 135 79 125 77     Note: 1/3/2024 patient presented to Urgent care and noted to be taking pseudoephedrine containing products which likely caused an increase in her BP.     ASSESSMENT OF SMBP MEASUREMENTS  Highest readin/97 mmHg  Lowest readin/73 mmHg  Left Arm Average: 128/76 mmHg (Not including 1/3/2024 BP)  Right Arm Average: 125/77 mmHg    Previous Encounter: 10/1/2023-10/25/2023   Left Arm  Right Arm    Date SBP DBP SBP DBP   10/1/2023       10/2/2023 143 81 134 85   10/3/2023       10/4/2023 132 88 131 82   10/5/2023       10/6/2023 147 84 146 83   10/7/2023 152 89 140 77   10/8/2023       10/9/2023       10/10/2023 130 77 133 81   10/11/2023 138 86 137 89   10/12/2023 137 77 136 76   10/13/2023 122 77 129 76   10/14/2023       10/15/2023       10/16/2023 120 79 120 78   10/17/2023 144 82 142 82   10/18/2023       10/19/2023 125 73 122 72   10/20/2023 129 78 135 75   10/21/2023       10/22/2023 122 75 125 73   10/23/2023       10/24/2023 125 78 128 79   10/25/2023 118 71 117 73     ASSESSMENT OF SMBP MEASUREMENTS  Highest readin/89 mmHg  Lowest readin/73 mmHg  Left Arm Average: 128/77 mmHg  Right Arm Average: 129/77 mmHg    INITIAL ASSESSMENT OF PREVIOUS SMBP MEASUREMENTS  Highest readin/89 mmHg  Lowest readin/81 mmHg  Average: 151/84 mmHg    Affordability/Accessibility: No issues  Adherence/Organization: No issues   Did you take your antihypertensive medications this morning: Yes  Time of Administration of Antihypertensive(s):  930 ; Retired - But works as a  from 5:30 am to 9 am   Have you missed any doses of your antihypertensives? No  "; If yes, how many doses? 0  Adverse Effects:   No noted adverse events since starting Amlodipine 5 mg   Denies dizziness, lightheadedness, falls    Objective     There were no vitals taken for this visit.    RELEVANT LAB RESULTS  Lab Results   Component Value Date    BILITOT 0.6 04/02/2019    CALCIUM 9.1 07/15/2023    CO2 27 07/15/2023     07/15/2023    CREATININE 0.70 07/15/2023    GLUCOSE 88 07/15/2023    ALKPHOS 83 04/02/2019    K 3.9 07/15/2023    PROT 7.0 04/02/2019     07/15/2023    AST 20 04/02/2019    ALT 16 07/15/2023    BUN 13 07/15/2023    ANIONGAP 11 07/15/2023    ALBUMIN 4.3 04/02/2019    GFRF >90 07/15/2023     Lab Results   Component Value Date    TRIG 49 07/15/2023    CHOL 192 07/15/2023    HDL 76.7 07/15/2023     No results found for: \"HGBA1C\"  The 10-year ASCVD risk score (Amish PARIS, et al., 2019) is: 10.6%    Values used to calculate the score:      Age: 68 years      Sex: Female      Is Non- : No      Diabetic: No      Tobacco smoker: No      Systolic Blood Pressure: 138 mmHg      Is BP treated: Yes      HDL Cholesterol: 76.7 mg/dL      Total Cholesterol: 192 mg/dL    DRUG INTERACTIONS  No significant drug-drug interactions exist that require change in therapy    Assessment/Plan   Problem List Items Addressed This Visit       Essential hypertension with goal blood pressure less than 130/85     Blood Pressure monitor is validated for at home use  Blood pressure log/diary was present during today's visit between previous visit and today.   Smoker status: non-smoker  Blood Pressure:  controlled  - I recommend continuation of therapy with current HTN regimen. If BP trends towards elevation and not at goal then consideration for increased amlodipine to 10 mg once daily would be warranted. Advised patient to continue monitoring until her PCP follow up. If continued services from pharmacy are necessary I am happy to follow up. I believe her BP to be controlled at " this time with assessment of her home readings.   BP goal of <130/85 is met   CONTINUE:   Lisinopril 40 mg 1 tablet by mouth once daily in the evening  Amlodipine 5 mg: Take one tablet by mouth once daily in the morning  Education:   I have advised Zita to continue taking her Amlodipine 5 mg once daily in the morning and change her time of administration of Lisinopril 40 mg to once daily in the evening before bedtime  Continue monitoring BP in both arms every other day. Follow up in 3 months.  Medications are properly dosed for renal and hepatic function.    Pharmacy Follow Up: PRN  PCP Follow Up: January 29, 2024    Deejay Sadler, PharmD    Continue all meds under the continuation of care with the referring provider and clinical pharmacy team.

## 2024-01-29 ENCOUNTER — OFFICE VISIT (OUTPATIENT)
Dept: PRIMARY CARE | Facility: CLINIC | Age: 69
End: 2024-01-29
Payer: MEDICARE

## 2024-01-29 VITALS
HEIGHT: 62 IN | HEART RATE: 65 BPM | BODY MASS INDEX: 26.28 KG/M2 | RESPIRATION RATE: 16 BRPM | TEMPERATURE: 97.2 F | WEIGHT: 142.8 LBS | SYSTOLIC BLOOD PRESSURE: 136 MMHG | DIASTOLIC BLOOD PRESSURE: 84 MMHG | OXYGEN SATURATION: 98 %

## 2024-01-29 DIAGNOSIS — Z23 IMMUNIZATION DUE: Primary | ICD-10-CM

## 2024-01-29 DIAGNOSIS — Z12.31 ENCOUNTER FOR SCREENING MAMMOGRAM FOR BREAST CANCER: ICD-10-CM

## 2024-01-29 DIAGNOSIS — E55.9 VITAMIN D DEFICIENCY: ICD-10-CM

## 2024-01-29 DIAGNOSIS — E78.5 DYSLIPIDEMIA, GOAL LDL BELOW 100: ICD-10-CM

## 2024-01-29 DIAGNOSIS — Z78.0 POSTMENOPAUSAL STATE: ICD-10-CM

## 2024-01-29 DIAGNOSIS — I10 ESSENTIAL HYPERTENSION WITH GOAL BLOOD PRESSURE LESS THAN 130/85: ICD-10-CM

## 2024-01-29 PROCEDURE — 1159F MED LIST DOCD IN RCRD: CPT | Performed by: INTERNAL MEDICINE

## 2024-01-29 PROCEDURE — 1123F ACP DISCUSS/DSCN MKR DOCD: CPT | Performed by: INTERNAL MEDICINE

## 2024-01-29 PROCEDURE — 3075F SYST BP GE 130 - 139MM HG: CPT | Performed by: INTERNAL MEDICINE

## 2024-01-29 PROCEDURE — 1036F TOBACCO NON-USER: CPT | Performed by: INTERNAL MEDICINE

## 2024-01-29 PROCEDURE — 3079F DIAST BP 80-89 MM HG: CPT | Performed by: INTERNAL MEDICINE

## 2024-01-29 PROCEDURE — 99214 OFFICE O/P EST MOD 30 MIN: CPT | Performed by: INTERNAL MEDICINE

## 2024-01-29 PROCEDURE — 1160F RVW MEDS BY RX/DR IN RCRD: CPT | Performed by: INTERNAL MEDICINE

## 2024-01-29 ASSESSMENT — ENCOUNTER SYMPTOMS
LOSS OF SENSATION IN FEET: 0
OCCASIONAL FEELINGS OF UNSTEADINESS: 0
DEPRESSION: 0

## 2024-01-29 NOTE — PROGRESS NOTES
"Subjective   Patient ID: Zita Thorne is a 68 y.o. female who presents for Follow-up.    Here for follow-up  Overall doing well.  Sadly she lost her 10-year-old Doberman pincher dog  Blood pressure at home typically in the 120s-recent average 128/76         Review of Systems    Objective   /84 (BP Location: Left arm, Patient Position: Sitting, BP Cuff Size: Adult)   Pulse 65   Temp 36.2 °C (97.2 °F)   Resp 16   Ht 1.562 m (5' 1.5\")   Wt 64.8 kg (142 lb 12.8 oz)   SpO2 98%   BMI 26.54 kg/m²     Physical Exam  Vitals reviewed.   Constitutional:       Appearance: Normal appearance.   HENT:      Head: Normocephalic and atraumatic.   Eyes:      General: No scleral icterus.        Right eye: No discharge.         Left eye: No discharge.      Extraocular Movements: Extraocular movements intact.      Conjunctiva/sclera: Conjunctivae normal.      Pupils: Pupils are equal, round, and reactive to light.   Cardiovascular:      Rate and Rhythm: Normal rate and regular rhythm.      Pulses: Normal pulses.      Heart sounds: Normal heart sounds. No murmur heard.  Pulmonary:      Effort: Pulmonary effort is normal.      Breath sounds: Normal breath sounds. No wheezing or rhonchi.   Musculoskeletal:         General: No deformity or signs of injury. Normal range of motion.      Cervical back: Normal range of motion and neck supple. No rigidity or tenderness.   Lymphadenopathy:      Cervical: No cervical adenopathy.   Skin:     General: Skin is warm and dry.      Findings: No rash.   Neurological:      General: No focal deficit present.      Mental Status: She is alert and oriented to person, place, and time. Mental status is at baseline.      Cranial Nerves: No cranial nerve deficit.      Sensory: No sensory deficit.      Gait: Gait normal.   Psychiatric:         Mood and Affect: Mood normal.         Behavior: Behavior normal.         Thought Content: Thought content normal.         Judgment: Judgment normal. "         Assessment/Plan   Problem List Items Addressed This Visit             ICD-10-CM    Vitamin D deficiency E55.9    Relevant Orders    Vitamin D 25-Hydroxy,Total (for eval of Vitamin D levels)    Essential hypertension with goal blood pressure less than 130/85 I10    Relevant Orders    Basic Metabolic Panel     Other Visit Diagnoses         Codes    Immunization due    -  Primary Z23    Relevant Medications    respiratory syncytial virus, RSV, vaccine, adjuvanted, age 65y+ (AREXVY) 120 mcg/0.5 mL suspension for reconstitution    Encounter for screening mammogram for breast cancer     Z12.31    Relevant Orders    BI mammo bilateral screening tomosynthesis    Postmenopausal state     Z78.0    Relevant Orders    XR DEXA bone density    Dyslipidemia, goal LDL below 100     E78.5    Relevant Orders    Lipid Panel    TSH with reflex to Free T4 if abnormal    Alanine Aminotransferase              Hypertension- blood pressure in the past has been controlled with low-dose lisinopril. She will continue healthy diet including low salt approach, as well as fitness routine.  Home blood pressure machine correlated.            Blood pressure improved on recheck-home machine has been checked with our pharmacy team and correlates-she will follow her blood pressure at home and notify us if the average top number is consistently above 130.  Recent home blood pressure average 128/76     Dyslipidemia-goal LDL under 100.          July 2023-.  For now we will continue off statin                1/24-annual fasting lipids ordered       exercise routine - at gym- [employer] near her home 4 x wk. - in AM before work, where she meets w/  who helps her w/ work outs. She teaches classes, and works out w/ a  once a week for an hour   She will continue her exercise workout routine at [employer] 4 x wk in the morning, teaching classes too they now have privileges to go to other health centers and so on Monday  "afternoon she does workouts, as well as Thursdays and Fridays.     Right shoulder rotator cuff issues/tendinitis- clearly came on after exercise class doing aggressive workouts. She will see orthopedics and change from ibuprofen to meloxicam.             Improved w/ input from Dr. Santamaria fall '19 with physical therapy    Neck spasms - spring '23 w/ tension headaches.             Improved w/ input per chiropractor, Dr. Olivia.         Right first MTP discomfort-minimal arthritic first MTP changes. Encouraged optimal arch support and wide fitting shoes           Feet OK now     Varicose vein-left tibial area will follow     Hx urticaria -- after Elderberry supp. she saw Dr. 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 Suggested Pepcid AC or Zantac 75 before work or spicy or acidic foods accordingly and follow-up if symptoms persist.              Presently not active issue     Hx loose bowel mvts - similar to around 2003, when she worked w/ Dr. Haley Goff / [facility], focusing on elimination diet. no severe pain, but still loose BMs - 2-3 BMs daily.  She will try to identify triggers and avoid these accordingly.                  Colonoscopy updated 10/18; next in 10 yrs - 10/28. No bowel Mv concerns now. Stable            With diet changes, she has no dyspepsia or IBS symptoms     Gynecologic care / postmenopausal - s/p pap in May '18 when she had RLQ pain, US showed ovaries \"OK\" but fluid in uterus. Negative EMBx June '18.    Presently no gyn concerns     Annual mammogram -updated Feb '23.  Ordered for this winter     Osteopenia/vitamin D deficiency-she " will continue vitamin D consistently, and DEXA scanning as below-- updated 9/17, as well as her weightbearing exercise routine.            DEXA updated Feb '22. Biannual surveillance.              Next in February 2024.  Ordered     Right hand pain-history of right hand fracture several years ago with several metacarpal fractures. Presently her index finger and ring finger on the right are sore. 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 s/p allergy visit sev. yrs ago.        Elevated intraocular pressure/floaters/glasses/vision care- patient will continue routine vision exams and visits at least biannually- updated recently w/ new glasses in 12/22.           7/23 update-she has had some right eye pressure problems and floaters.  She continues to work with Dr. Eve Glez glasses 12/23     Dental care-encouraged semiannual dental visits. Next appt 7/31/23  UTD     Skin Care concerns - encouraged skin care, sun screen when outdoors, and follow up w/ dermatology w/ any concerning skin changes. Hasn't needed dermatologist fortunately             She finds carbs tends to cause outbreaks on calves and tricep areas. improved        care home-she enjoyed a wonderful [employer] party July 2021 with many past coworkers. 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 her eldest grandson has had mental health issues-3 grandsons.  the youngest grandson is autistic.             7/23-her grandson is going to college at [employer].  Its been a good  summer with his graduation.      Flu shot encouraged each fall - Oct '23    RSV vacc - encouraged.     Prevnar 20- updated  2/23.     Shingrix series completed May '23     Covid vaccination series recommended/declined     Follow-up semiannually, sooner as needed

## 2024-02-13 DIAGNOSIS — I10 ESSENTIAL HYPERTENSION WITH GOAL BLOOD PRESSURE LESS THAN 130/85: ICD-10-CM

## 2024-02-16 RX ORDER — AMLODIPINE BESYLATE 5 MG/1
5 TABLET ORAL DAILY
Qty: 90 TABLET | Refills: 3 | Status: SHIPPED | OUTPATIENT
Start: 2024-02-16

## 2024-03-01 ENCOUNTER — APPOINTMENT (OUTPATIENT)
Dept: RADIOLOGY | Facility: HOSPITAL | Age: 69
End: 2024-03-01
Payer: MEDICARE

## 2024-03-05 ENCOUNTER — APPOINTMENT (OUTPATIENT)
Dept: RADIOLOGY | Facility: HOSPITAL | Age: 69
End: 2024-03-05
Payer: MEDICARE

## 2024-03-05 ENCOUNTER — HOSPITAL ENCOUNTER (OUTPATIENT)
Dept: RADIOLOGY | Facility: CLINIC | Age: 69
End: 2024-03-05
Payer: MEDICARE

## 2024-03-20 ENCOUNTER — HOSPITAL ENCOUNTER (OUTPATIENT)
Dept: RADIOLOGY | Facility: HOSPITAL | Age: 69
Discharge: HOME | End: 2024-03-20
Payer: MEDICARE

## 2024-03-20 DIAGNOSIS — Z12.31 ENCOUNTER FOR SCREENING MAMMOGRAM FOR BREAST CANCER: ICD-10-CM

## 2024-03-20 DIAGNOSIS — Z78.0 POSTMENOPAUSAL STATE: ICD-10-CM

## 2024-03-20 PROCEDURE — 77067 SCR MAMMO BI INCL CAD: CPT

## 2024-03-20 PROCEDURE — 77067 SCR MAMMO BI INCL CAD: CPT | Performed by: RADIOLOGY

## 2024-03-20 PROCEDURE — 77063 BREAST TOMOSYNTHESIS BI: CPT | Performed by: RADIOLOGY

## 2024-03-20 PROCEDURE — 77080 DXA BONE DENSITY AXIAL: CPT

## 2024-03-22 NOTE — RESULT ENCOUNTER NOTE
Farida    Your bone density test showed that your bone density was a bit low, which is called osteopenia.  This is the step before osteoporosis.  Please make sure you are trying to do weight bearing exercises each day, such as walking regularly, as you are able.  I would also recommend you start a Vitamin D supplement (2,000 IU daily) and a calcium supplement (600 - 1200 mg / day).  We will repeat this test in 2-3 years to see your progress.    Please let me know if you have any questions or concerns.    Thanks,  Osmany Kwok MD

## 2024-03-25 NOTE — RESULT ENCOUNTER NOTE
Farida    Thank you for doing the annual mammogram. The radiologist reports no worrisome findings. Please continue monthly self breast exams, as well as annual mammograms. Please contact me with any concerns.     Sincerely,     Osmany Kwok MD

## 2024-05-26 DIAGNOSIS — I10 ESSENTIAL HYPERTENSION WITH GOAL BLOOD PRESSURE LESS THAN 130/85: ICD-10-CM

## 2024-05-26 RX ORDER — LISINOPRIL 40 MG/1
TABLET ORAL
Qty: 90 TABLET | Refills: 3 | Status: SHIPPED | OUTPATIENT
Start: 2024-05-26

## 2024-06-19 DIAGNOSIS — F41.8 OTHER SPECIFIED ANXIETY DISORDERS: ICD-10-CM

## 2024-06-20 RX ORDER — CITALOPRAM 20 MG/1
20 TABLET, FILM COATED ORAL DAILY
Qty: 90 TABLET | Refills: 3 | Status: SHIPPED | OUTPATIENT
Start: 2024-06-20

## 2024-07-10 DIAGNOSIS — M19.90 ARTHRITIS: ICD-10-CM

## 2024-07-11 RX ORDER — MELOXICAM 15 MG/1
TABLET ORAL
Qty: 90 TABLET | Refills: 1 | Status: SHIPPED | OUTPATIENT
Start: 2024-07-11

## 2024-08-08 ENCOUNTER — LAB (OUTPATIENT)
Dept: LAB | Facility: LAB | Age: 69
End: 2024-08-08
Payer: MEDICARE

## 2024-08-08 DIAGNOSIS — I10 ESSENTIAL HYPERTENSION WITH GOAL BLOOD PRESSURE LESS THAN 130/85: ICD-10-CM

## 2024-08-08 DIAGNOSIS — E55.9 VITAMIN D DEFICIENCY: ICD-10-CM

## 2024-08-08 DIAGNOSIS — E78.5 DYSLIPIDEMIA, GOAL LDL BELOW 100: ICD-10-CM

## 2024-08-08 LAB
25(OH)D3 SERPL-MCNC: 68 NG/ML (ref 30–100)
ALT SERPL W P-5'-P-CCNC: 12 U/L (ref 7–45)
ANION GAP SERPL CALC-SCNC: 11 MMOL/L (ref 10–20)
BUN SERPL-MCNC: 14 MG/DL (ref 6–23)
CALCIUM SERPL-MCNC: 9.4 MG/DL (ref 8.6–10.3)
CHLORIDE SERPL-SCNC: 105 MMOL/L (ref 98–107)
CHOLEST SERPL-MCNC: 205 MG/DL (ref 0–199)
CHOLESTEROL/HDL RATIO: 3.2
CO2 SERPL-SCNC: 29 MMOL/L (ref 21–32)
CREAT SERPL-MCNC: 0.82 MG/DL (ref 0.5–1.05)
EGFRCR SERPLBLD CKD-EPI 2021: 78 ML/MIN/1.73M*2
GLUCOSE SERPL-MCNC: 94 MG/DL (ref 74–99)
HDLC SERPL-MCNC: 63.4 MG/DL
LDLC SERPL CALC-MCNC: 124 MG/DL
NON HDL CHOLESTEROL: 142 MG/DL (ref 0–149)
POTASSIUM SERPL-SCNC: 4.5 MMOL/L (ref 3.5–5.3)
SODIUM SERPL-SCNC: 140 MMOL/L (ref 136–145)
TRIGL SERPL-MCNC: 86 MG/DL (ref 0–149)
TSH SERPL-ACNC: 1.69 MIU/L (ref 0.44–3.98)
VLDL: 17 MG/DL (ref 0–40)

## 2024-08-08 PROCEDURE — 80061 LIPID PANEL: CPT

## 2024-08-08 PROCEDURE — 84443 ASSAY THYROID STIM HORMONE: CPT

## 2024-08-08 PROCEDURE — 36415 COLL VENOUS BLD VENIPUNCTURE: CPT

## 2024-08-08 PROCEDURE — 80048 BASIC METABOLIC PNL TOTAL CA: CPT

## 2024-08-08 PROCEDURE — 82306 VITAMIN D 25 HYDROXY: CPT

## 2024-08-08 PROCEDURE — 84460 ALANINE AMINO (ALT) (SGPT): CPT

## 2024-08-08 NOTE — RESULT ENCOUNTER NOTE
Results reviewed. No urgent findings.  Will Review results in detail at upcoming office appointment scheduled soon.      Osmany Kwok MD

## 2024-08-15 ENCOUNTER — APPOINTMENT (OUTPATIENT)
Dept: PRIMARY CARE | Facility: CLINIC | Age: 69
End: 2024-08-15
Payer: MEDICARE

## 2024-08-15 VITALS
WEIGHT: 140 LBS | HEART RATE: 68 BPM | RESPIRATION RATE: 16 BRPM | TEMPERATURE: 97.1 F | SYSTOLIC BLOOD PRESSURE: 128 MMHG | BODY MASS INDEX: 26.43 KG/M2 | HEIGHT: 61 IN | OXYGEN SATURATION: 98 % | DIASTOLIC BLOOD PRESSURE: 70 MMHG

## 2024-08-15 DIAGNOSIS — I10 ESSENTIAL HYPERTENSION WITH GOAL BLOOD PRESSURE LESS THAN 130/85: ICD-10-CM

## 2024-08-15 DIAGNOSIS — Z00.00 ROUTINE GENERAL MEDICAL EXAMINATION AT HEALTH CARE FACILITY: Primary | ICD-10-CM

## 2024-08-15 DIAGNOSIS — Z23 IMMUNIZATION DUE: ICD-10-CM

## 2024-08-15 DIAGNOSIS — E78.5 DYSLIPIDEMIA, GOAL LDL BELOW 100: ICD-10-CM

## 2024-08-15 PROCEDURE — 1160F RVW MEDS BY RX/DR IN RCRD: CPT | Performed by: INTERNAL MEDICINE

## 2024-08-15 PROCEDURE — 3008F BODY MASS INDEX DOCD: CPT | Performed by: INTERNAL MEDICINE

## 2024-08-15 PROCEDURE — 1036F TOBACCO NON-USER: CPT | Performed by: INTERNAL MEDICINE

## 2024-08-15 PROCEDURE — 3074F SYST BP LT 130 MM HG: CPT | Performed by: INTERNAL MEDICINE

## 2024-08-15 PROCEDURE — 1159F MED LIST DOCD IN RCRD: CPT | Performed by: INTERNAL MEDICINE

## 2024-08-15 PROCEDURE — 99397 PER PM REEVAL EST PAT 65+ YR: CPT | Performed by: INTERNAL MEDICINE

## 2024-08-15 PROCEDURE — G0439 PPPS, SUBSEQ VISIT: HCPCS | Performed by: INTERNAL MEDICINE

## 2024-08-15 PROCEDURE — 1123F ACP DISCUSS/DSCN MKR DOCD: CPT | Performed by: INTERNAL MEDICINE

## 2024-08-15 PROCEDURE — 99214 OFFICE O/P EST MOD 30 MIN: CPT | Performed by: INTERNAL MEDICINE

## 2024-08-15 PROCEDURE — 1170F FXNL STATUS ASSESSED: CPT | Performed by: INTERNAL MEDICINE

## 2024-08-15 PROCEDURE — 3078F DIAST BP <80 MM HG: CPT | Performed by: INTERNAL MEDICINE

## 2024-08-15 RX ORDER — BERBERINE CHLOR/SEAWEED/CHROM 500-250 MG
1 CAPSULE ORAL DAILY
COMMUNITY

## 2024-08-15 ASSESSMENT — ENCOUNTER SYMPTOMS
DEPRESSION: 0
LOSS OF SENSATION IN FEET: 0
OCCASIONAL FEELINGS OF UNSTEADINESS: 0

## 2024-08-15 ASSESSMENT — PATIENT HEALTH QUESTIONNAIRE - PHQ9
SUM OF ALL RESPONSES TO PHQ9 QUESTIONS 1 AND 2: 0
2. FEELING DOWN, DEPRESSED OR HOPELESS: NOT AT ALL
1. LITTLE INTEREST OR PLEASURE IN DOING THINGS: NOT AT ALL

## 2024-08-15 ASSESSMENT — ACTIVITIES OF DAILY LIVING (ADL)
TOILETING: INDEPENDENT
JUDGMENT_ADEQUATE_SAFELY_COMPLETE_DAILY_ACTIVITIES: YES
ASSISTIVE_DEVICE: EYEGLASSES
GROCERY_SHOPPING: INDEPENDENT
FEEDING YOURSELF: INDEPENDENT
TAKING_MEDICATION: INDEPENDENT
PATIENT'S MEMORY ADEQUATE TO SAFELY COMPLETE DAILY ACTIVITIES?: YES
MANAGING_FINANCES: INDEPENDENT
ADEQUATE_TO_COMPLETE_ADL: YES
BATHING: INDEPENDENT
GROOMING: INDEPENDENT
DRESSING: INDEPENDENT
DOING_HOUSEWORK: INDEPENDENT

## 2024-08-15 NOTE — PROGRESS NOTES
Subjective   Reason for Visit: Zita Thorne is an 69 y.o. female here for a Medicare Wellness visit.     Past Medical, Surgical, and Family History reviewed and updated in chart.    Reviewed all medications by prescribing practitioner or clinical pharmacist (such as prescriptions, OTCs, herbal therapies and supplements) and documented in the medical record.    Here for follow up and wellness visit.  Overall doing well for the most part.    She got a new puppy on her birthday    She has been busy as this just came last week    She has been having some mid back pain typically bending forward demonstrating weight routines and her exercise classes      Current Outpatient Medications   Medication Instructions    alpha tocopherol (VITAMIN E) 1,000 Units, oral, Daily    amLODIPine (NORVASC) 5 mg, oral, Daily    berberine chloride 500 mg capsule 1 capsule, oral, Daily    Bifidobacterium infantis (ALIGN ORAL) 1 capsule, oral, Daily    biotin 5 mg, oral, Daily    cholecalciferol (Vitamin D-3) 125 MCG (5000 UT) capsule 1 capsule, oral, Daily    citalopram (CELEXA) 20 mg, oral, Daily    coenzyme Q-10 100 mg, oral, Daily    COLLAGEN-BIOTIN-ASCORBIC ACID ORAL 10 mL, oral, Daily    cyanocobalamin (vitamin B-12) 5,000 mcg, sublingual, Daily    fish oil concentrate (Omega-3) 120-180 mg capsule 1 g, oral, Daily    L-Glutamine 500 mg, oral, Daily    L.acid,gas,plan,rhm/B.ani/cran (UP4 PROBIOTICS WOMEN'S ORAL) 1 tablet, oral, Daily    lisinopril 40 mg tablet TAKE 1 TABLET BY MOUTH EARLY IN  THE MORNING    MAGNESIUM CITRATE ORAL 1,300 mg, oral, Daily    meloxicam (Mobic) 15 mg tablet TAKE 1 TABLET BY MOUTH ONCE  DAILY AS NEEDED FOR ARTHRITIS  PAIN WITH FOOD; NOT MEANT TO BE  USED ON A DAILY BASIS    multivitamin tablet 1 tablet, oral, Daily    respiratory syncytial virus, RSV, vaccine, adjuvanted, age 60y+ (Arexvy) 120 mcg/0.5 mL suspension for reconstitution 0.5 mL, intramuscular, Once    TURMERIC ORAL 1,500 mg, oral, Daily  "        Patient Care Team:  Osmany Kwok MD as PCP - General     Review of Systems    Objective   Vitals:  /70 (BP Location: Left arm, Patient Position: Sitting, BP Cuff Size: Adult)   Pulse 68   Temp 36.2 °C (97.1 °F) (Skin)   Resp 16   Ht 1.549 m (5' 1\")   Wt 63.5 kg (140 lb)   SpO2 98%   BMI 26.45 kg/m²       Physical Exam  Vitals reviewed.   Constitutional:       Appearance: Normal appearance.   HENT:      Head: Normocephalic and atraumatic.   Eyes:      General: No scleral icterus.        Right eye: No discharge.         Left eye: No discharge.      Extraocular Movements: Extraocular movements intact.      Conjunctiva/sclera: Conjunctivae normal.      Pupils: Pupils are equal, round, and reactive to light.   Cardiovascular:      Rate and Rhythm: Normal rate and regular rhythm.      Pulses: Normal pulses.      Heart sounds: Normal heart sounds. No murmur heard.  Pulmonary:      Effort: Pulmonary effort is normal.      Breath sounds: Normal breath sounds. No wheezing or rhonchi.   Musculoskeletal:         General: No deformity or signs of injury. Normal range of motion.      Cervical back: Normal range of motion and neck supple. No rigidity or tenderness.      Comments: She localized the pain in the mid thoracic area.  No flank pain no spine pain   Lymphadenopathy:      Cervical: No cervical adenopathy.   Skin:     General: Skin is warm and dry.      Findings: No rash.   Neurological:      General: No focal deficit present.      Mental Status: She is alert and oriented to person, place, and time. Mental status is at baseline.      Cranial Nerves: No cranial nerve deficit.      Sensory: No sensory deficit.      Gait: Gait normal.   Psychiatric:         Mood and Affect: Mood normal.         Behavior: Behavior normal.         Thought Content: Thought content normal.         Judgment: Judgment normal.         Assessment/Plan   Problem List Items Addressed This Visit             ICD-10-CM    Essential " hypertension with goal blood pressure less than 130/85 I10    Relevant Orders    CT cardiac scoring wo IV contrast     Other Visit Diagnoses         Codes    Routine general medical examination at health care facility    -  Primary Z00.00    Relevant Orders    1 Year Follow Up In [facility] - PCP - Wellness Exam    CT cardiac scoring wo IV contrast    Dyslipidemia, goal LDL below 100     E78.5    Relevant Orders    CT cardiac scoring wo IV contrast    Immunization due     Z23    Relevant Medications    respiratory syncytial virus, RSV, vaccine, adjuvanted, age 60y+ (Arexvy) 120 mcg/0.5 mL suspension for reconstitution            Lab on 08/08/2024   Component Date Value Ref Range Status    Cholesterol 08/08/2024 205 (H)  0 - 199 mg/dL Final          Age      Desirable   Borderline High   High     0-19 Y     0 - 169       170 - 199     >/= 200    20-24 Y     0 - 189       190 - 224     >/= 225         >24 Y     0 - 199       200 - 239     >/= 240   **All ranges are based on fasting samples. Specific   therapeutic targets will vary based on patient-specific   cardiac risk.    Pediatric guidelines reference:Pediatrics 2011, 128(S5).Adult guidelines reference: NCEP ATPIII Guidelines,TONYA 2001, 258:2486-97    Venipuncture immediately after or during the administration of Metamizole may lead to falsely low results. Testing should be performed immediately prior to Metamizole dosing.    HDL-Cholesterol 08/08/2024 63.4  mg/dL Final      Age       Very Low   Low     Normal    High    0-19 Y    < 35      < 40     40-45     ----  20-24 Y    ----     < 40      >45      ----        >24 Y      ----     < 40     40-60      >60      Cholesterol/HDL Ratio 08/08/2024 3.2   Final      Ref Values  Desirable  < 3.4  High Risk  > 5.0    LDL Calculated 08/08/2024 124 (H)  <=99 mg/dL Final                                Near   Borderline      AGE      Desirable  Optimal    High     High     Very High     0-19 Y     0 - 109     ---     110-129   >/= 130     ----    20-24 Y     0 - 119     ---    120-159   >/= 160     ----      >24 Y     0 -  99   100-129  130-159   160-189     >/=190      VLDL 08/08/2024 17  0 - 40 mg/dL Final    Triglycerides 08/08/2024 86  0 - 149 mg/dL Final       Age         Desirable   Borderline High   High     Very High   0 D-90 D    19 - 174         ----         ----        ----  91 D- 9 Y     0 -  74        75 -  99     >/= 100      ----    10-19 Y     0 -  89        90 - 129     >/= 130      ----    20-24 Y     0 - 114       115 - 149     >/= 150      ----         >24 Y     0 - 149       150 - 199    200- 499    >/= 500    Venipuncture immediately after or during the administration of Metamizole may lead to falsely low results. Testing should be performed immediately prior to Metamizole dosing.    Non HDL Cholesterol 08/08/2024 142  0 - 149 mg/dL Final          Age       Desirable   Borderline High   High     Very High     0-19 Y     0 - 119       120 - 144     >/= 145    >/= 160    20-24 Y     0 - 149       150 - 189     >/= 190      ----         >24 Y    30 mg/dL above LDL Cholesterol goal      Thyroid Stimulating Hormone 08/08/2024 1.69  0.44 - 3.98 mIU/L Final    Glucose 08/08/2024 94  74 - 99 mg/dL Final    Sodium 08/08/2024 140  136 - 145 mmol/L Final    Potassium 08/08/2024 4.5  3.5 - 5.3 mmol/L Final    Chloride 08/08/2024 105  98 - 107 mmol/L Final    Bicarbonate 08/08/2024 29  21 - 32 mmol/L Final    Anion Gap 08/08/2024 11  10 - 20 mmol/L Final    Urea Nitrogen 08/08/2024 14  6 - 23 mg/dL Final    Creatinine 08/08/2024 0.82  0.50 - 1.05 mg/dL Final    eGFR 08/08/2024 78  >60 mL/min/1.73m*2 Final    Calculations of estimated GFR are performed using the 2021 CKD-EPI Study Refit equation without the race variable for the IDMS-Traceable creatinine methods.  https://jasn.asnjournals.org/content/early/2021/09/22/ASN.1363278651    Calcium 08/08/2024 9.4  8.6 - 10.3 mg/dL Final    Vitamin D, 25-Hydroxy, Total  08/08/2024 68  30 - 100 ng/mL Final    ALT 08/08/2024 12  7 - 45 U/L Final    Patients treated with Sulfasalazine may generate falsely decreased results for ALT.           Portions of this encounter note have been copied from my previous note dated 1/29/24  , which have been updated where appropriate and all reflect my current medical decision making from today.     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  Is been a lot of sun for her and her family getting used to the new routine          Hypertension- blood pressure in the past has been controlled with low-dose lisinopril. She will continue healthy diet including low salt approach, as well as fitness routine.  Home blood pressure machine correlated.            Blood pressure improved on recheck-home machine has been checked with our pharmacy team and correlates-she will follow her blood pressure at home and notify us if the average top number is consistently above 130.  Recent home blood pressure average 128/76              8/24-blood pressure borderline-will continue to follow     Dyslipidemia-goal LDL under 100.          July 2023-.  For now we will continue off statin                1/24-annual fasting lipids ordered                  8/24-.  She will check a calcium score with her family history of heart disease    exercise routine - at gym- [employer] near her home 4 x wk. 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  Rather than bending at the waist, encouraged her to bend at the hips to minimize thoracic  "muscle strain.  Heating pad encouraged and follow-up if not improved.  Once again she will focus on optimizing back mechanics with lifting and bending     Right shoulder rotator cuff issues/tendinitis- clearly came on after exercise class doing aggressive workouts. She will see orthopedics and change from ibuprofen to meloxicam.             Improved w/ input from Dr. Santamaria fall '19 with physical therapy    Neck spasms - spring '23 w/ tension headaches.             Improved w/ input per chiropractor, Dr. Olivia.         Right first MTP discomfort-minimal arthritic first MTP changes. Encouraged optimal arch support and wide fitting shoes           Feet OK now    Left knee arthroscopy- around 1995. Knee OK now     Varicose vein-left tibial area will follow     Hx urticaria -- after Elderberry supp. she saw Dr. 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 Suggested Pepcid AC or Zantac 75 before work or spicy or acidic foods accordingly and follow-up if symptoms persist.              Presently not active issue     Hx loose bowel mvts - similar to around 2003, when she worked w/ Dr. Haley Goff / [facility], focusing on elimination diet. no severe pain, but still loose BMs - 2-3 BMs daily.  She will try to identify triggers and avoid these accordingly.                  Colonoscopy updated 10/18; next in 10 yrs - 10/28. No bowel Mv concerns now. Stable            With diet changes, she has no dyspepsia or IBS symptoms     Gynecologic care / postmenopausal - s/p pap in May '18 when she had RLQ pain, US showed ovaries \"OK\" but fluid in uterus. " Negative EMBx June '18.    Presently no gyn concerns     Annual mammogram -updated March 2024     Osteopenia/vitamin D deficiency-she will continue vitamin D consistently, and DEXA scanning as below-- updated 9/17, as well as her weightbearing exercise routine.            DEXA updated March 2024 biannual surveillance.              Next DEXA March 2026     right hand pain-history of right hand fracture several years ago with several metacarpal fractures. Presently her index finger and ring finger on the right are sore. 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 s/p allergy visit sev. yrs ago.        Elevated intraocular pressure/floaters/glasses/vision care- patient will continue routine vision exams and visits at least biannually- updated recently w/ new glasses in 12/22.           7/23 update-she has had some right eye pressure problems and floaters.  She continues to work with Dr. Eve Glez glasses 12/23            Next appt Oct 18th, 2024     Dental care-encouraged semiannual dental visits.   Appt Feb '24      UTD     Skin Care concerns - encouraged skin care, sun screen when outdoors, and follow up w/ dermatology w/ any concerning skin changes. Hasn't needed dermatologist fortunately             She finds carbs tends to cause outbreaks on calves and tricep areas. improved        skilled nursing-she enjoyed a wonderful [employer] party July 2021 with many past coworkers. 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 her eldest  grandson has had mental health issues-3 grandsons.  the youngest grandson is autistic.             7/23-her grandson is going to college at [employer].  Its been a good summer with his graduation.      Flu shot encouraged each fall - Oct '23           Encouraged flu shot in September    RSV vacc - encouraged.  Slip provided again 8/24     Prevnar 20- updated  2/23.     Shingrix series completed May '23     Covid vaccination series recommended/declined             8/24-encouraged COVID booster this fall     Follow-up semiannually, sooner as needed

## 2024-10-03 ENCOUNTER — HOSPITAL ENCOUNTER (OUTPATIENT)
Dept: RADIOLOGY | Facility: CLINIC | Age: 69
Discharge: HOME | End: 2024-10-03
Payer: MEDICARE

## 2024-10-03 DIAGNOSIS — Z00.00 ROUTINE GENERAL MEDICAL EXAMINATION AT HEALTH CARE FACILITY: ICD-10-CM

## 2024-10-03 DIAGNOSIS — E78.5 DYSLIPIDEMIA, GOAL LDL BELOW 100: ICD-10-CM

## 2024-10-03 DIAGNOSIS — I10 ESSENTIAL HYPERTENSION WITH GOAL BLOOD PRESSURE LESS THAN 130/85: ICD-10-CM

## 2024-10-03 PROCEDURE — 75571 CT HRT W/O DYE W/CA TEST: CPT

## 2024-10-13 DIAGNOSIS — E78.5 DYSLIPIDEMIA, GOAL LDL BELOW 70: ICD-10-CM

## 2024-10-13 DIAGNOSIS — R93.1 AGATSTON CORONARY ARTERY CALCIUM SCORE BETWEEN 200 AND 399: Primary | ICD-10-CM

## 2024-10-13 DIAGNOSIS — I10 HYPERTENSION, UNSPECIFIED TYPE: ICD-10-CM

## 2024-10-13 DIAGNOSIS — E78.5 DYSLIPIDEMIA, GOAL LDL BELOW 70: Primary | ICD-10-CM

## 2024-10-13 PROBLEM — H60.11 CELLULITIS OF RIGHT EAR: Status: RESOLVED | Noted: 2023-07-03 | Resolved: 2024-10-13

## 2024-10-13 PROBLEM — R29.6 RECURRENT FALLS: Status: RESOLVED | Noted: 2023-07-03 | Resolved: 2024-10-13

## 2024-10-13 PROBLEM — J06.9 UPPER RESPIRATORY INFECTION, ACUTE: Status: RESOLVED | Noted: 2023-07-03 | Resolved: 2024-10-13

## 2024-10-13 PROBLEM — M67.813 BICEPS TENDONOSIS OF RIGHT SHOULDER: Status: RESOLVED | Noted: 2023-07-03 | Resolved: 2024-10-13

## 2024-10-13 RX ORDER — ATORVASTATIN CALCIUM 20 MG/1
20 TABLET, FILM COATED ORAL DAILY
Qty: 100 TABLET | Refills: 3 | Status: SHIPPED | OUTPATIENT
Start: 2024-10-13 | End: 2025-11-17

## 2024-10-13 RX ORDER — ASPIRIN 81 MG/1
81 TABLET ORAL DAILY
Qty: 90 TABLET | Refills: 3 | Status: SHIPPED | OUTPATIENT
Start: 2024-10-13 | End: 2025-10-13

## 2024-12-03 DIAGNOSIS — M19.90 ARTHRITIS: ICD-10-CM

## 2024-12-04 RX ORDER — MELOXICAM 15 MG/1
TABLET ORAL
Qty: 90 TABLET | Refills: 3 | Status: SHIPPED | OUTPATIENT
Start: 2024-12-04

## 2024-12-27 ENCOUNTER — OFFICE VISIT (OUTPATIENT)
Dept: CARDIOLOGY | Facility: CLINIC | Age: 69
End: 2024-12-27
Payer: MEDICARE

## 2024-12-27 VITALS
DIASTOLIC BLOOD PRESSURE: 80 MMHG | WEIGHT: 139 LBS | OXYGEN SATURATION: 98 % | HEART RATE: 65 BPM | BODY MASS INDEX: 26.24 KG/M2 | SYSTOLIC BLOOD PRESSURE: 147 MMHG | HEIGHT: 61 IN

## 2024-12-27 DIAGNOSIS — I10 ESSENTIAL HYPERTENSION WITH GOAL BLOOD PRESSURE LESS THAN 130/85: ICD-10-CM

## 2024-12-27 DIAGNOSIS — R93.1 AGATSTON CORONARY ARTERY CALCIUM SCORE BETWEEN 200 AND 399: ICD-10-CM

## 2024-12-27 PROCEDURE — 99213 OFFICE O/P EST LOW 20 MIN: CPT | Performed by: INTERNAL MEDICINE

## 2024-12-27 PROCEDURE — 1126F AMNT PAIN NOTED NONE PRSNT: CPT | Performed by: INTERNAL MEDICINE

## 2024-12-27 PROCEDURE — 3008F BODY MASS INDEX DOCD: CPT | Performed by: INTERNAL MEDICINE

## 2024-12-27 PROCEDURE — 99203 OFFICE O/P NEW LOW 30 MIN: CPT | Performed by: INTERNAL MEDICINE

## 2024-12-27 PROCEDURE — 1036F TOBACCO NON-USER: CPT | Performed by: INTERNAL MEDICINE

## 2024-12-27 PROCEDURE — 1123F ACP DISCUSS/DSCN MKR DOCD: CPT | Performed by: INTERNAL MEDICINE

## 2024-12-27 PROCEDURE — 3079F DIAST BP 80-89 MM HG: CPT | Performed by: INTERNAL MEDICINE

## 2024-12-27 PROCEDURE — 1159F MED LIST DOCD IN RCRD: CPT | Performed by: INTERNAL MEDICINE

## 2024-12-27 PROCEDURE — 93005 ELECTROCARDIOGRAM TRACING: CPT | Performed by: INTERNAL MEDICINE

## 2024-12-27 PROCEDURE — 3077F SYST BP >= 140 MM HG: CPT | Performed by: INTERNAL MEDICINE

## 2024-12-27 ASSESSMENT — PAIN SCALES - GENERAL: PAINLEVEL_OUTOF10: 0-NO PAIN

## 2024-12-27 NOTE — PROGRESS NOTES
Herbert tan name : Zita Thorne    : 1955   MRN : [id number]   ENC Date : 24     Reason for visit: Elevated coronary calcium score    Assessment and Plan:  Coronary artery calcification: Patient is asymptomatic with normal EKG.  No need for stress testing.  Patient has already been started on statin therapy.  Recommend no changes.  Realistically she will be 74 years old when we would think about repeating a coronary calcium score.  At that point it may be of little benefit but she can address that at that point with her primary care physician.  We may have more information on repeat coronary calcium scores by then.  Hypertension: Blood pressure is reasonably well-controlled.  Recommend no medication changes for now.  Disp: RTO on an as-needed basis      HPI:  Patient is seen today for evaluation of elevated coronary calcium score.  Her coronary calcium score done as a screening test was 277.  She is without any cardiac symptoms.  Thoracic aorta measured normal in diameter.  She has no palpitations.  No syncopal events.  No TIA or CVA-like symptoms.  No claudication.  She was started on statin therapy by her primary care physician.      Problem List:   Patient Active Problem List   Diagnosis    Allergic rhinitis    Ankle arthritis    Bunion    Hammer toe of left foot    Dyslipidemia, goal LDL below 70    Fibrocystic breast disease (FCBD)    Osteopenia    Postcholecystectomy diarrhea    Seborrhea    Situational anxiety    Stiffness of right shoulder, not elsewhere classified    Vitamin D deficiency    Wears glasses    IBS (irritable bowel syndrome)    Essential hypertension with goal blood pressure less than 130/85    Agatston coronary artery calcium score between 200 and 399        Meds:   Current Outpatient Medications on File Prior to Visit   Medication Sig Dispense Refill    alpha tocopherol (Vitamin E) 670 mg (1,000 unit) capsule Take 1 capsule (1,000 Units) by mouth once daily.       amLODIPine (Norvasc) 5 mg tablet TAKE 1 TABLET BY MOUTH ONCE  DAILY 90 tablet 3    aspirin 81 mg EC tablet Take 1 tablet (81 mg) by mouth once daily. To prevent heart attack 90 tablet 3    atorvastatin (Lipitor) 20 mg tablet Take 1 tablet (20 mg) by mouth once daily. For high cholesterol 100 tablet 3    berberine chloride 500 mg capsule Take 1 capsule by mouth once daily.      Bifidobacterium infantis (ALIGN ORAL) Take 1 capsule by mouth once daily.      biotin 5 mg capsule Take 1 capsule (5 mg) by mouth once daily.      cholecalciferol (Vitamin D-3) 125 MCG (5000 UT) capsule Take 1 capsule (125 mcg) by mouth once daily.      citalopram (CeleXA) 20 mg tablet TAKE 1 TABLET BY MOUTH ONCE  DAILY 90 tablet 3    coenzyme Q-10 100 mg capsule Take 1 capsule (100 mg) by mouth once daily.      COLLAGEN-BIOTIN-ASCORBIC ACID ORAL Take 10 mL by mouth early in the morning..      cyanocobalamin, vitamin B-12, 5,000 mcg tablet, sublingual Place 5,000 mcg under the tongue once daily.      fish oil concentrate (Omega-3) 120-180 mg capsule Take 1 capsule (1 g) by mouth once daily.      glutamine (L-Glutamine) 500 mg capsule Take 500 mg by mouth once daily.      L.acid,gas,plan,rhm/B.ani/cran (UP4 PROBIOTICS WOMEN'S ORAL) Take 1 tablet by mouth once daily.      lisinopril 40 mg tablet TAKE 1 TABLET BY MOUTH EARLY IN  THE MORNING 90 tablet 3    MAGNESIUM CITRATE ORAL Take 1,300 mg by mouth once daily.      meloxicam (Mobic) 15 mg tablet TAKE 1 TABLET BY MOUTH ONCE  DAILY WITH FOOD AS NEEDED FOR  ARTHRITIS PAIN. NOT MEANT TO BE  USED ON A DAILY BASIS 90 tablet 3    multivitamin tablet Take 1 tablet by mouth once daily.      TURMERIC ORAL Take 1,500 mg by mouth once daily.       No current facility-administered medications on file prior to visit.       All:   Allergies   Allergen Reactions    Cider Vinegar Hives    Kiwi Hives    Strawberry Hives    Watermelon Hives     watermelon       Fam Hx:   Family History   Problem Relation Name Age  "of Onset    Heart disease Mother Latonia Taylor          at 87,  after 3vcabg, and renal failure    Hernia Mother Latonia Taylor     Hypertension Mother Latonia Taylor     Diabetes Father Ramírez Taylor     Heart disease Father Ramírez Taylor     Hypertension Father Ramírez Pradoado     Kidney disease Father Ramírez Taylor     Stroke Father Ramírez Taylor          at 65 after cva    Diabetes Sister Madonna Olson     Heart disease Brother Fred Taylor     Hypertension Brother Fred Taylor     Stroke Brother Fred Taylor     No Known Problems Brother      No Known Problems Brother      No Known Problems Daughter         Soc Hx:   Social History     Socioeconomic History    Marital status:      Spouse name: Not on file    Number of children: Not on file    Years of education: Not on file    Highest education level: Not on file   Occupational History    Not on file   Tobacco Use    Smoking status: Never    Smokeless tobacco: Never   Substance and Sexual Activity    Alcohol use: Never    Drug use: Never    Sexual activity: Yes     Partners: Male     Birth control/protection: None     Comment:  for 45 years   Other Topics Concern    Not on file   Social History Narrative    Not on file     Social Drivers of Health     Financial Resource Strain: Not on file   Food Insecurity: Not on file   Transportation Needs: Not on file   Physical Activity: Not on file   Stress: Not on file   Social Connections: Not on file   Intimate Partner Violence: Not on file   Housing Stability: Not on file       VS: /80 (BP Location: Right arm, Patient Position: Sitting, BP Cuff Size: Adult)   Pulse 65   Ht 1.549 m (5' 1\")   Wt 63 kg (139 lb)   SpO2 98%   BMI 26.26 kg/m²      Physical Exam  Vitals reviewed.   Constitutional:       Appearance: Normal appearance.   Eyes:      Pupils: Pupils are equal, round, and reactive to light.   Neck:      Vascular: No JVD.   Cardiovascular:      Rate and Rhythm: " Normal rate and regular rhythm.      Pulses: Normal pulses.      Heart sounds: No murmur heard.     No gallop.   Pulmonary:      Effort: No respiratory distress.      Breath sounds: No wheezing or rales.   Abdominal:      General: Abdomen is flat. There is no distension.      Palpations: Abdomen is soft.   Musculoskeletal:         General: No swelling.      Right lower leg: No edema.      Left lower leg: No edema.   Neurological:      General: No focal deficit present.      Mental Status: She is alert.   Psychiatric:         Mood and Affect: Mood normal.          ECG: Normal sinus rhythm with isolated PACs.  Otherwise unremarkable    Cr Hartley MD

## 2024-12-30 LAB
ATRIAL RATE: 68 BPM
P AXIS: 9 DEGREES
P OFFSET: 197 MS
P ONSET: 148 MS
PR INTERVAL: 150 MS
Q ONSET: 223 MS
QRS COUNT: 11 BEATS
QRS DURATION: 76 MS
QT INTERVAL: 406 MS
QTC CALCULATION(BAZETT): 431 MS
QTC FREDERICIA: 423 MS
R AXIS: 10 DEGREES
T AXIS: 20 DEGREES
T OFFSET: 426 MS
VENTRICULAR RATE: 68 BPM

## 2024-12-30 RX ORDER — AMLODIPINE BESYLATE 5 MG/1
5 TABLET ORAL DAILY
Qty: 90 TABLET | Refills: 3 | Status: SHIPPED | OUTPATIENT
Start: 2024-12-30

## 2025-02-21 LAB
ALT SERPL-CCNC: 14 U/L (ref 6–29)
ANION GAP SERPL CALCULATED.4IONS-SCNC: 11 MMOL/L (CALC) (ref 7–17)
BUN SERPL-MCNC: 16 MG/DL (ref 7–25)
BUN/CREAT SERPL: NORMAL (CALC) (ref 6–22)
CALCIUM SERPL-MCNC: 9.5 MG/DL (ref 8.6–10.4)
CHLORIDE SERPL-SCNC: 104 MMOL/L (ref 98–110)
CHOLEST SERPL-MCNC: 151 MG/DL
CHOLEST/HDLC SERPL: 2 (CALC)
CO2 SERPL-SCNC: 25 MMOL/L (ref 20–32)
CREAT SERPL-MCNC: 0.72 MG/DL (ref 0.5–1.05)
EGFRCR SERPLBLD CKD-EPI 2021: 90 ML/MIN/1.73M2
GLUCOSE SERPL-MCNC: 91 MG/DL (ref 65–99)
HDLC SERPL-MCNC: 74 MG/DL
LDLC SERPL CALC-MCNC: 64 MG/DL (CALC)
NONHDLC SERPL-MCNC: 77 MG/DL (CALC)
POTASSIUM SERPL-SCNC: 4.8 MMOL/L (ref 3.5–5.3)
SODIUM SERPL-SCNC: 140 MMOL/L (ref 135–146)
TRIGL SERPL-MCNC: 52 MG/DL

## 2025-02-27 ENCOUNTER — APPOINTMENT (OUTPATIENT)
Dept: PRIMARY CARE | Facility: CLINIC | Age: 70
End: 2025-02-27
Payer: MEDICARE

## 2025-02-27 VITALS
BODY MASS INDEX: 26.62 KG/M2 | OXYGEN SATURATION: 96 % | HEIGHT: 61 IN | HEART RATE: 83 BPM | WEIGHT: 141 LBS | DIASTOLIC BLOOD PRESSURE: 86 MMHG | SYSTOLIC BLOOD PRESSURE: 126 MMHG

## 2025-02-27 DIAGNOSIS — Z12.31 ENCOUNTER FOR SCREENING MAMMOGRAM FOR BREAST CANCER: ICD-10-CM

## 2025-02-27 DIAGNOSIS — Z00.00 ROUTINE GENERAL MEDICAL EXAMINATION AT HEALTH CARE FACILITY: ICD-10-CM

## 2025-02-27 DIAGNOSIS — E78.5 DYSLIPIDEMIA, GOAL LDL BELOW 70: ICD-10-CM

## 2025-02-27 DIAGNOSIS — I10 ESSENTIAL HYPERTENSION WITH GOAL BLOOD PRESSURE LESS THAN 130/85: ICD-10-CM

## 2025-02-27 DIAGNOSIS — Z23 IMMUNIZATION DUE: ICD-10-CM

## 2025-02-27 DIAGNOSIS — R93.1 AGATSTON CORONARY ARTERY CALCIUM SCORE BETWEEN 200 AND 399: Primary | ICD-10-CM

## 2025-02-27 PROCEDURE — 99214 OFFICE O/P EST MOD 30 MIN: CPT | Performed by: INTERNAL MEDICINE

## 2025-02-27 PROCEDURE — G2211 COMPLEX E/M VISIT ADD ON: HCPCS | Performed by: INTERNAL MEDICINE

## 2025-02-27 PROCEDURE — 3008F BODY MASS INDEX DOCD: CPT | Performed by: INTERNAL MEDICINE

## 2025-02-27 PROCEDURE — 1159F MED LIST DOCD IN RCRD: CPT | Performed by: INTERNAL MEDICINE

## 2025-02-27 PROCEDURE — 1160F RVW MEDS BY RX/DR IN RCRD: CPT | Performed by: INTERNAL MEDICINE

## 2025-02-27 PROCEDURE — 1123F ACP DISCUSS/DSCN MKR DOCD: CPT | Performed by: INTERNAL MEDICINE

## 2025-02-27 PROCEDURE — 3074F SYST BP LT 130 MM HG: CPT | Performed by: INTERNAL MEDICINE

## 2025-02-27 PROCEDURE — 3079F DIAST BP 80-89 MM HG: CPT | Performed by: INTERNAL MEDICINE

## 2025-02-27 NOTE — PROGRESS NOTES
"Subjective   Patient ID: Zita Thorne is a 69 y.o. female who presents for Follow-up.    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         Review of Systems    Objective   /86   Pulse 83   Ht 1.549 m (5' 1\")   Wt 64 kg (141 lb)   SpO2 96%   BMI 26.64 kg/m²     Physical Exam  Vitals reviewed.   Constitutional:       Appearance: Normal appearance.   HENT:      Head: Normocephalic and atraumatic.   Eyes:      General: No scleral icterus.        Right eye: No discharge.         Left eye: No discharge.      Extraocular Movements: Extraocular movements intact.      Conjunctiva/sclera: Conjunctivae normal.      Pupils: Pupils are equal, round, and reactive to light.   Cardiovascular:      Rate and Rhythm: Normal rate and regular rhythm.      Pulses: Normal pulses.      Heart sounds: Normal heart sounds. No murmur heard.  Pulmonary:      Effort: Pulmonary effort is normal.      Breath sounds: Normal breath sounds. No wheezing or rhonchi.   Musculoskeletal:         General: No deformity or signs of injury. Normal range of motion.      Cervical back: Normal range of motion and neck supple. No rigidity or tenderness.   Lymphadenopathy:      Cervical: No cervical adenopathy.   Skin:     General: Skin is warm and dry.      Findings: No rash.   Neurological:      General: No focal deficit present.      Mental Status: She is alert and oriented to person, place, and time. Mental status is at baseline.      Cranial Nerves: No cranial nerve deficit.      Sensory: No sensory deficit.      Gait: Gait normal.   Psychiatric:         Mood and Affect: Mood normal.         Behavior: Behavior normal.         Thought Content: Thought content normal.         Judgment: Judgment normal.         Assessment/Plan   Problem List Items Addressed This Visit             ICD-10-CM    " Dyslipidemia, goal LDL below 70 E78.5    Essential hypertension with goal blood pressure less than 130/85 I10    Agatston coronary artery calcium score between 200 and 399 - Primary R93.1     Other Visit Diagnoses         Codes    Routine general medical examination at health care facility     Z00.00    Immunization due     Z23    Encounter for screening mammogram for breast cancer     Z12.31    Relevant Orders    BI mammo bilateral screening tomosynthesis              Portions of this encounter note have been copied from my previous note dated 8/15/24  , which have been updated where appropriate and all reflect my current medical decision making from today.     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  Is been a lot of sun for her and her family getting used to the new routine    CT calcium score reviewed from 10/3/2024  Labs reviewed from 2/20/2025    Elevated CT calcium score-10/24-calcium score 277.  She met with Dr. Hartley in 12/24.  She will continue her statin baby aspirin, blood pressure regiment including lisinopril and lifestyle efforts including fitness and Mediterranean diet.        Hypertension- blood pressure in the past has been controlled with low-dose lisinopril. She will continue healthy diet including low salt approach, as well as fitness routine.  Home blood pressure machine correlated.            Blood pressure improved on recheck-home machine has been checked with our pharmacy team and correlates-she will follow her blood pressure at home and notify us if the average top number is consistently above 130.  Recent home blood pressure average 128/76              8/24-blood pressure borderline-will continue to follow             2/25-blood pressure improved      Dyslipidemia-goal LDL now under 70            8/24-.  She will check a calcium score with her family history of heart disease             2/24-LDL 64, with her statin.    exercise routine - at gym- [employer] near her home 4 x wk. 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  She also works for an hour weekly with her .  She also walks her dog 40 minutes 3 to 4 days weekly.  On average getting 6-10,000 steps daily      Midthoracic spasms-recently a concern lifting kettle bells up during weight workout demonstration.  Rather than bending at the waist, encouraged her to bend at the hips to minimize thoracic muscle strain.  Heating pad encouraged and follow-up if not improved.  Once again she will focus on optimizing back mechanics with lifting and bending              Improved     Right shoulder rotator cuff issues/tendinitis- clearly came on after exercise class doing aggressive workouts. She will see orthopedics and change from ibuprofen to meloxicam.             Improved w/ input from Dr. Santamaria fall '19 with physical therapy    Neck spasms - spring '23 w/ tension headaches.             Improved w/ input per chiropractor, Dr. Olivia.         Right first MTP discomfort-minimal arthritic first MTP changes. Encouraged optimal arch support and wide fitting shoes           Feet OK now    Left knee arthroscopy- around 1995. Knee OK now     Varicose vein-left tibial area will follow     Hx urticaria -- after Elderberry supp. she saw Dr. 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     Occasional epigastric discomfort-clearly much more stress at work. Suggested Pepcid AC or Zantac 75 before work or spicy or acidic foods accordingly and follow-up if symptoms persist.              Presently not active issue     Hx loose bowel mvts - similar to around 2003, when she worked w/ Dr. Haley Goff / [facility], focusing on elimination diet. no severe pain, but still loose BMs - 2-3 BMs daily.  She will try to identify triggers and avoid these accordingly.                  Colonoscopy updated 10/18; next in 10 yrs - 10/28. No bowel Mv concerns now. Stable            With diet changes, she has no dyspepsia or IBS symptoms     Gynecologic care / postmenopausal - s/p pap in May '18 when she had RLQ pain, US showed ovaries \"OK\" but fluid in uterus. Negative EMBx June '18.    Presently no gyn concerns     Annual mammogram -updated March 2024.  Ordered for this year     Osteopenia/vitamin D deficiency-she will continue vitamin D consistently, and DEXA scanning as below-- updated 9/17, as well as her weightbearing exercise routine.            DEXA updated March 2024 biannual surveillance.              Next DEXA March 2026     right hand pain-history of right hand fracture several years ago with several metacarpal fractures. Presently her index finger and ring finger on the right are sore. 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 s/p allergy visit sev. yrs ago.        Elevated intraocular pressure/floaters/glasses/vision care- patient will continue routine vision exams and visits at least biannually- " updated recently w/ new glasses in 12/22.           7/23 update-she has had some right eye pressure problems and floaters.  She continues to work with Dr. Eve Glez glasses 12/23            Next appt Oct 18th, 2024     Dental care-encouraged semiannual dental visits.   Appt Feb '24      UTD     Skin Care concerns - encouraged skin care, sun screen when outdoors, and follow up w/ dermatology w/ any concerning skin changes. Hasn't needed dermatologist fortunately             She finds carbs tends to cause outbreaks on calves and tricep areas. improved        half[place]way-she enjoyed a wonderful longterm party July 2021 with many past coworkers. 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 her eldest grandson has had mental health issues-3 grandsons.  the youngest grandson is autistic.             7/23-her grandson is going to college at [employer].  Its been a good summer with his graduation.          Flu shot encouraged each fall - Sep '24           Encouraged flu shot in September    RSV vacc - encouraged.  Slip provided again 8/24, and 2/25     Prevnar 20- updated  2/23.     Shingrix series completed May '23     Covid vaccination series recommended/declined             8/24-encouraged COVID booster this fall     Follow-up semiannually, sooner as needed

## 2025-04-14 ENCOUNTER — HOSPITAL ENCOUNTER (OUTPATIENT)
Dept: RADIOLOGY | Facility: HOSPITAL | Age: 70
Discharge: HOME | End: 2025-04-14
Payer: MEDICARE

## 2025-04-14 DIAGNOSIS — Z12.31 ENCOUNTER FOR SCREENING MAMMOGRAM FOR BREAST CANCER: ICD-10-CM

## 2025-04-14 PROCEDURE — 77067 SCR MAMMO BI INCL CAD: CPT | Performed by: RADIOLOGY

## 2025-04-14 PROCEDURE — 77063 BREAST TOMOSYNTHESIS BI: CPT | Performed by: RADIOLOGY

## 2025-04-14 PROCEDURE — 77067 SCR MAMMO BI INCL CAD: CPT

## 2025-04-19 NOTE — RESULT ENCOUNTER NOTE
Farida - Thank you for doing the annual mammogram. The radiologist reports no worrisome findings. Please continue monthly self breast exams, as well as annual mammograms. Please contact me with any concerns.     Sincerely,     Osmany Kwok MD

## 2025-04-27 DIAGNOSIS — I10 ESSENTIAL HYPERTENSION WITH GOAL BLOOD PRESSURE LESS THAN 130/85: ICD-10-CM

## 2025-04-28 RX ORDER — LISINOPRIL 40 MG/1
TABLET ORAL
Qty: 90 TABLET | Refills: 3 | Status: SHIPPED | OUTPATIENT
Start: 2025-04-28

## 2025-04-30 DIAGNOSIS — F41.8 OTHER SPECIFIED ANXIETY DISORDERS: ICD-10-CM

## 2025-05-01 RX ORDER — CITALOPRAM 20 MG/1
20 TABLET, FILM COATED ORAL DAILY
Qty: 90 TABLET | Refills: 1 | Status: SHIPPED | OUTPATIENT
Start: 2025-05-01

## 2025-08-21 ENCOUNTER — APPOINTMENT (OUTPATIENT)
Dept: PRIMARY CARE | Facility: CLINIC | Age: 70
End: 2025-08-21
Payer: MEDICARE

## 2025-08-21 VITALS
HEIGHT: 61 IN | HEART RATE: 69 BPM | BODY MASS INDEX: 25.75 KG/M2 | DIASTOLIC BLOOD PRESSURE: 82 MMHG | SYSTOLIC BLOOD PRESSURE: 133 MMHG | WEIGHT: 136.4 LBS | OXYGEN SATURATION: 99 %

## 2025-08-21 DIAGNOSIS — E55.9 VITAMIN D DEFICIENCY: ICD-10-CM

## 2025-08-21 DIAGNOSIS — F41.8 SITUATIONAL ANXIETY: ICD-10-CM

## 2025-08-21 DIAGNOSIS — E78.5 HYPERLIPIDEMIA, UNSPECIFIED HYPERLIPIDEMIA TYPE: ICD-10-CM

## 2025-08-21 DIAGNOSIS — Z79.899 ENCOUNTER FOR LONG-TERM (CURRENT) USE OF MEDICATIONS: ICD-10-CM

## 2025-08-21 DIAGNOSIS — Z00.00 ROUTINE GENERAL MEDICAL EXAMINATION AT HEALTH CARE FACILITY: ICD-10-CM

## 2025-08-21 DIAGNOSIS — Z13.6 SCREENING FOR HEART DISEASE: ICD-10-CM

## 2025-08-21 DIAGNOSIS — R53.83 OTHER FATIGUE: ICD-10-CM

## 2025-08-21 DIAGNOSIS — R93.1 AGATSTON CORONARY ARTERY CALCIUM SCORE BETWEEN 200 AND 399: ICD-10-CM

## 2025-08-21 DIAGNOSIS — J30.1 SEASONAL ALLERGIC RHINITIS DUE TO POLLEN: ICD-10-CM

## 2025-08-21 DIAGNOSIS — K58.9 IRRITABLE BOWEL SYNDROME, UNSPECIFIED TYPE: ICD-10-CM

## 2025-08-21 DIAGNOSIS — E78.5 DYSLIPIDEMIA, GOAL LDL BELOW 70: ICD-10-CM

## 2025-08-21 DIAGNOSIS — Z97.3 WEARS GLASSES: ICD-10-CM

## 2025-08-21 DIAGNOSIS — I10 ESSENTIAL HYPERTENSION WITH GOAL BLOOD PRESSURE LESS THAN 130/85: Primary | ICD-10-CM

## 2025-08-21 PROCEDURE — 3079F DIAST BP 80-89 MM HG: CPT | Performed by: INTERNAL MEDICINE

## 2025-08-21 PROCEDURE — 1036F TOBACCO NON-USER: CPT | Performed by: INTERNAL MEDICINE

## 2025-08-21 PROCEDURE — 3075F SYST BP GE 130 - 139MM HG: CPT | Performed by: INTERNAL MEDICINE

## 2025-08-21 PROCEDURE — 99214 OFFICE O/P EST MOD 30 MIN: CPT | Performed by: INTERNAL MEDICINE

## 2025-08-21 PROCEDURE — 3008F BODY MASS INDEX DOCD: CPT | Performed by: INTERNAL MEDICINE

## 2025-08-21 PROCEDURE — G0439 PPPS, SUBSEQ VISIT: HCPCS | Performed by: INTERNAL MEDICINE

## 2025-08-21 PROCEDURE — 1160F RVW MEDS BY RX/DR IN RCRD: CPT | Performed by: INTERNAL MEDICINE

## 2025-08-21 PROCEDURE — 1159F MED LIST DOCD IN RCRD: CPT | Performed by: INTERNAL MEDICINE

## 2025-08-21 PROCEDURE — 99397 PER PM REEVAL EST PAT 65+ YR: CPT | Performed by: INTERNAL MEDICINE

## 2025-08-21 PROCEDURE — 1170F FXNL STATUS ASSESSED: CPT | Performed by: INTERNAL MEDICINE

## 2025-08-21 ASSESSMENT — ACTIVITIES OF DAILY LIVING (ADL)
BATHING: INDEPENDENT
TOILETING: INDEPENDENT
ADEQUATE_TO_COMPLETE_ADL: YES
TAKING_MEDICATION: INDEPENDENT
PATIENT'S MEMORY ADEQUATE TO SAFELY COMPLETE DAILY ACTIVITIES?: YES
DRESSING: INDEPENDENT
MANAGING_FINANCES: INDEPENDENT
GROOMING: INDEPENDENT
ASSISTIVE_DEVICE: EYEGLASSES
DOING_HOUSEWORK: INDEPENDENT
FEEDING YOURSELF: INDEPENDENT
JUDGMENT_ADEQUATE_SAFELY_COMPLETE_DAILY_ACTIVITIES: YES
GROCERY_SHOPPING: INDEPENDENT

## 2025-08-21 ASSESSMENT — PATIENT HEALTH QUESTIONNAIRE - PHQ9
2. FEELING DOWN, DEPRESSED OR HOPELESS: NOT AT ALL
1. LITTLE INTEREST OR PLEASURE IN DOING THINGS: NOT AT ALL
SUM OF ALL RESPONSES TO PHQ9 QUESTIONS 1 AND 2: 0

## 2026-02-16 ENCOUNTER — APPOINTMENT (OUTPATIENT)
Dept: PRIMARY CARE | Facility: CLINIC | Age: 71
End: 2026-02-16
Payer: MEDICARE

## 2026-08-31 ENCOUNTER — APPOINTMENT (OUTPATIENT)
Dept: PRIMARY CARE | Facility: CLINIC | Age: 71
End: 2026-08-31
Payer: MEDICARE